# Patient Record
Sex: MALE | Race: WHITE | NOT HISPANIC OR LATINO | URBAN - METROPOLITAN AREA
[De-identification: names, ages, dates, MRNs, and addresses within clinical notes are randomized per-mention and may not be internally consistent; named-entity substitution may affect disease eponyms.]

---

## 2022-12-21 ENCOUNTER — OUTPATIENT (OUTPATIENT)
Dept: OUTPATIENT SERVICES | Facility: HOSPITAL | Age: 22
LOS: 1 days | Discharge: PSYCHIATRIC OR SUBSTANCE USE FACILITY | End: 2022-12-21
Payer: COMMERCIAL

## 2022-12-21 PROCEDURE — 90791 PSYCH DIAGNOSTIC EVALUATION: CPT | Mod: 95

## 2022-12-28 PROCEDURE — 99214 OFFICE O/P EST MOD 30 MIN: CPT | Mod: 95

## 2022-12-30 PROCEDURE — 90834 PSYTX W PT 45 MINUTES: CPT | Mod: 95

## 2023-01-20 DIAGNOSIS — F32.1 MAJOR DEPRESSIVE DISORDER, SINGLE EPISODE, MODERATE: ICD-10-CM

## 2023-02-01 PROCEDURE — 90834 PSYTX W PT 45 MINUTES: CPT | Mod: 95

## 2023-02-08 PROCEDURE — 90834 PSYTX W PT 45 MINUTES: CPT | Mod: 95

## 2023-02-08 PROCEDURE — 99214 OFFICE O/P EST MOD 30 MIN: CPT | Mod: 95

## 2023-02-16 PROCEDURE — 90832 PSYTX W PT 30 MINUTES: CPT | Mod: 95

## 2023-02-22 PROCEDURE — 99214 OFFICE O/P EST MOD 30 MIN: CPT | Mod: 95

## 2023-03-02 PROCEDURE — 90834 PSYTX W PT 45 MINUTES: CPT | Mod: 95

## 2023-03-08 PROCEDURE — 99214 OFFICE O/P EST MOD 30 MIN: CPT | Mod: 95

## 2023-03-10 PROCEDURE — 90834 PSYTX W PT 45 MINUTES: CPT | Mod: 95

## 2023-03-15 PROCEDURE — 90832 PSYTX W PT 30 MINUTES: CPT | Mod: 95

## 2023-03-22 PROCEDURE — 90834 PSYTX W PT 45 MINUTES: CPT | Mod: 95

## 2023-03-22 PROCEDURE — 99214 OFFICE O/P EST MOD 30 MIN: CPT | Mod: 95

## 2023-03-29 PROCEDURE — 90834 PSYTX W PT 45 MINUTES: CPT | Mod: 95

## 2023-04-05 PROCEDURE — 90834 PSYTX W PT 45 MINUTES: CPT | Mod: 95

## 2023-04-05 PROCEDURE — 99214 OFFICE O/P EST MOD 30 MIN: CPT | Mod: 95

## 2023-04-19 PROCEDURE — 99214 OFFICE O/P EST MOD 30 MIN: CPT | Mod: 95

## 2023-04-19 PROCEDURE — 90832 PSYTX W PT 30 MINUTES: CPT

## 2023-05-03 PROCEDURE — 99214 OFFICE O/P EST MOD 30 MIN: CPT | Mod: 95

## 2023-05-03 PROCEDURE — 90834 PSYTX W PT 45 MINUTES: CPT | Mod: 95

## 2024-01-24 ENCOUNTER — OUTPATIENT (OUTPATIENT)
Dept: OUTPATIENT SERVICES | Facility: HOSPITAL | Age: 24
LOS: 1 days | Discharge: STILL ADMITTED OR ADMINISTRATIVE | End: 2024-01-24
Payer: COMMERCIAL

## 2024-01-24 ENCOUNTER — INPATIENT (INPATIENT)
Facility: HOSPITAL | Age: 24
LOS: 6 days | Discharge: HOME | End: 2024-01-31
Attending: STUDENT IN AN ORGANIZED HEALTH CARE EDUCATION/TRAINING PROGRAM | Admitting: STUDENT IN AN ORGANIZED HEALTH CARE EDUCATION/TRAINING PROGRAM
Payer: COMMERCIAL

## 2024-01-24 DIAGNOSIS — F33.3 MAJOR DEPRESSIVE DISORDER, RECURRENT, SEVERE WITH PSYCHOTIC SYMPTOMS: ICD-10-CM

## 2024-01-24 LAB
A1C WITH ESTIMATED AVERAGE GLUCOSE RESULT: 4.7 % — SIGNIFICANT CHANGE UP (ref 4–5.6)
AMPHET UR-MCNC: NEGATIVE — SIGNIFICANT CHANGE UP
ANION GAP SERPL CALC-SCNC: 11 MMOL/L — SIGNIFICANT CHANGE UP (ref 7–14)
APAP SERPL-MCNC: <10 UG/ML — LOW (ref 15–25)
BARBITURATES UR SCN-MCNC: NEGATIVE — SIGNIFICANT CHANGE UP
BENZODIAZ UR-MCNC: POSITIVE
BUN SERPL-MCNC: 16 MG/DL — SIGNIFICANT CHANGE UP (ref 7–23)
CALCIUM SERPL-MCNC: 10.1 MG/DL — SIGNIFICANT CHANGE UP (ref 8.4–10.5)
CHLORIDE SERPL-SCNC: 100 MMOL/L — SIGNIFICANT CHANGE UP (ref 98–107)
CHOLEST SERPL-MCNC: 169 MG/DL — SIGNIFICANT CHANGE UP
CO2 SERPL-SCNC: 28 MMOL/L — SIGNIFICANT CHANGE UP (ref 22–31)
COCAINE METAB.OTHER UR-MCNC: NEGATIVE — SIGNIFICANT CHANGE UP
CREAT SERPL-MCNC: 1.08 MG/DL — SIGNIFICANT CHANGE UP (ref 0.5–1.3)
CREATININE URINE RESULT, DAU: 506 MG/DL — SIGNIFICANT CHANGE UP
EGFR: 100 ML/MIN/1.73M2 — SIGNIFICANT CHANGE UP
ESTIMATED AVERAGE GLUCOSE: 88 — SIGNIFICANT CHANGE UP
ETHANOL SERPL-MCNC: <10 MG/DL — SIGNIFICANT CHANGE UP
FLUAV AG NPH QL: SIGNIFICANT CHANGE UP
FLUBV AG NPH QL: SIGNIFICANT CHANGE UP
GLUCOSE SERPL-MCNC: 92 MG/DL — SIGNIFICANT CHANGE UP (ref 70–99)
HDLC SERPL-MCNC: 55 MG/DL — SIGNIFICANT CHANGE UP
LIPID PNL WITH DIRECT LDL SERPL: 93 MG/DL — SIGNIFICANT CHANGE UP
METHADONE UR-MCNC: NEGATIVE — SIGNIFICANT CHANGE UP
NON HDL CHOLESTEROL: 114 MG/DL — SIGNIFICANT CHANGE UP
OPIATES UR-MCNC: NEGATIVE — SIGNIFICANT CHANGE UP
OXYCODONE UR-MCNC: NEGATIVE — SIGNIFICANT CHANGE UP
PCP SPEC-MCNC: SIGNIFICANT CHANGE UP
PCP UR-MCNC: NEGATIVE — SIGNIFICANT CHANGE UP
POTASSIUM SERPL-MCNC: 3.7 MMOL/L — SIGNIFICANT CHANGE UP (ref 3.5–5.3)
POTASSIUM SERPL-SCNC: 3.7 MMOL/L — SIGNIFICANT CHANGE UP (ref 3.5–5.3)
RSV RNA NPH QL NAA+NON-PROBE: SIGNIFICANT CHANGE UP
SALICYLATES SERPL-MCNC: <0.3 MG/DL — LOW (ref 15–30)
SARS-COV-2 RNA SPEC QL NAA+PROBE: SIGNIFICANT CHANGE UP
SODIUM SERPL-SCNC: 139 MMOL/L — SIGNIFICANT CHANGE UP (ref 135–145)
THC UR QL: POSITIVE
TOXICOLOGY SCREEN, DRUGS OF ABUSE, SERUM RESULT: SIGNIFICANT CHANGE UP
TRIGL SERPL-MCNC: 106 MG/DL — SIGNIFICANT CHANGE UP
TSH SERPL-MCNC: 1.03 UIU/ML — SIGNIFICANT CHANGE UP (ref 0.27–4.2)

## 2024-01-24 PROCEDURE — 93010 ELECTROCARDIOGRAM REPORT: CPT

## 2024-01-24 PROCEDURE — 90839 PSYTX CRISIS INITIAL 60 MIN: CPT

## 2024-01-24 PROCEDURE — 90840 PSYTX CRISIS EA ADDL 30 MIN: CPT

## 2024-01-24 PROCEDURE — 99222 1ST HOSP IP/OBS MODERATE 55: CPT

## 2024-01-24 RX ORDER — LANOLIN ALCOHOL/MO/W.PET/CERES
6 CREAM (GRAM) TOPICAL AT BEDTIME
Refills: 0 | Status: DISCONTINUED | OUTPATIENT
Start: 2024-01-24 | End: 2024-01-25

## 2024-01-24 RX ORDER — ALPRAZOLAM 0.25 MG
0.5 TABLET ORAL DAILY
Refills: 0 | Status: DISCONTINUED | OUTPATIENT
Start: 2024-01-24 | End: 2024-01-31

## 2024-01-24 RX ORDER — FLUOXETINE HCL 10 MG
20 CAPSULE ORAL DAILY
Refills: 0 | Status: DISCONTINUED | OUTPATIENT
Start: 2024-01-24 | End: 2024-01-25

## 2024-01-24 RX ORDER — BUPROPION HYDROCHLORIDE 150 MG/1
300 TABLET, EXTENDED RELEASE ORAL DAILY
Refills: 0 | Status: DISCONTINUED | OUTPATIENT
Start: 2024-01-24 | End: 2024-01-25

## 2024-01-24 RX ORDER — HYDROXYZINE HCL 10 MG
25 TABLET ORAL EVERY 6 HOURS
Refills: 0 | Status: DISCONTINUED | OUTPATIENT
Start: 2024-01-24 | End: 2024-01-31

## 2024-01-24 RX ORDER — DIPHENHYDRAMINE HCL 50 MG
50 CAPSULE ORAL ONCE
Refills: 0 | Status: DISCONTINUED | OUTPATIENT
Start: 2024-01-24 | End: 2024-01-31

## 2024-01-24 RX ORDER — MAGNESIUM HYDROXIDE 400 MG/1
30 TABLET, CHEWABLE ORAL DAILY
Refills: 0 | Status: DISCONTINUED | OUTPATIENT
Start: 2024-01-24 | End: 2024-01-31

## 2024-01-24 RX ORDER — ALPRAZOLAM 0.25 MG
1 TABLET ORAL
Refills: 0 | DISCHARGE

## 2024-01-24 RX ORDER — ACETAMINOPHEN 500 MG
650 TABLET ORAL EVERY 6 HOURS
Refills: 0 | Status: DISCONTINUED | OUTPATIENT
Start: 2024-01-24 | End: 2024-01-31

## 2024-01-24 RX ORDER — ARIPIPRAZOLE 15 MG/1
1 TABLET ORAL DAILY
Refills: 0 | Status: DISCONTINUED | OUTPATIENT
Start: 2024-01-24 | End: 2024-01-26

## 2024-01-24 RX ADMIN — Medication 1 MILLIGRAM(S): at 21:36

## 2024-01-24 RX ADMIN — Medication 6 MILLIGRAM(S): at 20:32

## 2024-01-24 NOTE — BH INPATIENT PSYCHIATRY ASSESSMENT NOTE - NSBHMETABOLIC_PSY_ALL_CORE_FT
BMI:   HbA1c:   Glucose:   BP: --Vital Signs Last 24 Hrs  T(C): --  T(F): --  HR: --  BP: --  BP(mean): --  RR: --  SpO2: --      Lipid Panel:  BMI:   HbA1c: A1C with Estimated Average Glucose Result: 4.7 % (01-24-24 @ 19:18)    Glucose:   BP: --Vital Signs Last 24 Hrs  T(C): --  T(F): --  HR: --  BP: --  BP(mean): --  RR: --  SpO2: --      Lipid Panel:

## 2024-01-24 NOTE — BH INPATIENT PSYCHIATRY ASSESSMENT NOTE - RISK ASSESSMENT
Acute risk to self given mood episode, insomnia, poor appetite, missed class with difficulty getting out of bed, struggling to do ADLs, low energy, no motivation and anhedonia. Pt reports recurrent intrusive thoughts that his friends don’t like him, he’s a burden, he’s better off dead, he’s a loser and has no future and girlfriend is cheating on him; SI/attempts and preparations were impulsive and unsure if he can remain safe in the community. He had a self-aborted attempt/gesture this morning where he placed a belt around his neck

## 2024-01-24 NOTE — BH INPATIENT PSYCHIATRY ASSESSMENT NOTE - CURRENT MEDICATION
MEDICATIONS  (STANDING):  ARIPiprazole 1 milliGRAM(s) Oral daily  buPROPion XL (24-Hour) 300 milliGRAM(s) Oral daily  FLUoxetine 20 milliGRAM(s) Oral daily    MEDICATIONS  (PRN):  ALPRAZolam 0.5 milliGRAM(s) Oral daily PRN for anxiety  hydrOXYzine hydrochloride 25 milliGRAM(s) Oral every 6 hours PRN Restlessness   MEDICATIONS  (STANDING):  ARIPiprazole 1 milliGRAM(s) Oral daily  buPROPion XL (24-Hour) 300 milliGRAM(s) Oral daily  FLUoxetine 20 milliGRAM(s) Oral daily  melatonin. 6 milliGRAM(s) Oral at bedtime    MEDICATIONS  (PRN):  acetaminophen     Tablet .. 650 milliGRAM(s) Oral every 6 hours PRN Temp greater or equal to 38C (100.4F), Mild Pain (1 - 3)  ALPRAZolam 0.5 milliGRAM(s) Oral daily PRN for anxiety  aluminum hydroxide/magnesium hydroxide/simethicone Suspension 30 milliLiter(s) Oral every 6 hours PRN Dyspepsia  diphenhydrAMINE Injectable 50 milliGRAM(s) IntraMuscular once PRN agitation  hydrOXYzine hydrochloride 25 milliGRAM(s) Oral every 6 hours PRN Restlessness  LORazepam   Injectable 2 milliGRAM(s) IntraMuscular once PRN agitation  magnesium hydroxide Suspension 30 milliLiter(s) Oral daily PRN Constipation

## 2024-01-24 NOTE — BH INPATIENT PSYCHIATRY ASSESSMENT NOTE - NSCOMMENTSUICRISKFACT_PSY_ALL_CORE
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

## 2024-01-24 NOTE — CHART NOTE - NSCHARTNOTEFT_GEN_A_CORE
Screening Medical Evaluation    Patient Admitted from: Crisis center     [facility] [place] admitting diagnosis: Severe episode of recurrent major depressive disorder, with psychotic features      PAST MEDICAL & SURGICAL HISTORY:        Allergies    No Known Allergies    Intolerances          Social History:       FAMILY HISTORY:        MEDICATIONS  (STANDING):  ARIPiprazole 1 milliGRAM(s) Oral daily  buPROPion XL (24-Hour) 300 milliGRAM(s) Oral daily  FLUoxetine 20 milliGRAM(s) Oral daily  LORazepam     Tablet 1 milliGRAM(s) Oral once  melatonin. 6 milliGRAM(s) Oral at bedtime    MEDICATIONS  (PRN):  acetaminophen     Tablet .. 650 milliGRAM(s) Oral every 6 hours PRN Temp greater or equal to 38C (100.4F), Mild Pain (1 - 3)  ALPRAZolam 0.5 milliGRAM(s) Oral daily PRN for anxiety  aluminum hydroxide/magnesium hydroxide/simethicone Suspension 30 milliLiter(s) Oral every 6 hours PRN Dyspepsia  diphenhydrAMINE Injectable 50 milliGRAM(s) IntraMuscular once PRN agitation  hydrOXYzine hydrochloride 25 milliGRAM(s) Oral every 6 hours PRN Restlessness  LORazepam   Injectable 2 milliGRAM(s) IntraMuscular once PRN agitation  magnesium hydroxide Suspension 30 milliLiter(s) Oral daily PRN Constipation            PHYSICAL EXAM:  GENERAL: NAD.   HEAD:  Atraumatic, Normocephalic  EYES: Wears glasses EOMI, PERRLA, conjunctiva and sclera clear  NECK: Supple, No JVD  CHEST/LUNG: Clear to auscultation bilaterally; No wheeze  HEART: Regular rate and rhythm; No murmurs, rubs, or gallops  ABDOMEN: Positive BS,  Soft, Nontender.   EXTREMITIES:  2+ Peripheral Pulses, No clubbing, cyanosis, or edema  PSYCH: Calm and cooperative   NEUROLOGY: non-focal  SKIN: No rashes or lesions seen on exposed skin.     LABS:    01-24    139  |  100  |  16  ----------------------------<  92  3.7   |  28  |  1.08    Ca    10.1      24 Jan 2024 19:18            Urinalysis Basic - ( 24 Jan 2024 19:18 )    Color: x / Appearance: x / SG: x / pH: x  Gluc: 92 mg/dL / Ketone: x  / Bili: x / Urobili: x   Blood: x / Protein: x / Nitrite: x   Leuk Esterase: x / RBC: x / WBC x   Sq Epi: x / Non Sq Epi: x / Bacteria: x        RADIOLOGY & ADDITIONAL TESTS:      Assessment and Plan:  23M admitted to [facility] for Severe episode of recurrent major depressive disorder, with psychotic features. No PMHx. Pt seen for medical screening evaluation. Patient has no acute complaints at this time. Patient denies fever, chills, headache, dizziness, lightheadedness, N/V, SOB, cough, congestion, chest pain, abdominal pain, dysuria, hematuria, diarrhea, constipation. Physical exam unremarkable, VSS. Labs above. 1/24 EKG: NSR 86b/ min, RAD, QT/ QTC= 354/ 423.     1.) Severe episode of recurrent major depressive disorder, with psychotic features: Plan per primary team.

## 2024-01-24 NOTE — BH INPATIENT PSYCHIATRY ASSESSMENT NOTE - HPI (INCLUDE ILLNESS QUALITY, SEVERITY, DURATION, TIMING, CONTEXT, MODIFYING FACTORS, ASSOCIATED SIGNS AND SYMPTOMS)
DIRECT ADMISSION FROM [facility]: a 23-year-old male, single, non-caregiver, college student at [employer] residing in off campus housing with 5 friends and permanently domiciled with parents and older sister just moved out of home, first sought care in Fall 2022 when he first felt anxious and experiencing panic attacks, dx of MDD with anxious distress, hx of experimenting with agents (last semester took Adderall for about 1 week obtained from someone else); Cannabis use ~ 1x/week; most recent use last week. ETOH use fluctuates from 2x/week to 5x/week; at most drank 10 drinks with blackouts; denies complicated withdrawals/DTs/seizures. Pt was engaged in care with [facility] 12/21/22-5/12/23. Pt was referred to [facility] following a suicidal gesture of consuming ETOH and Klonopin, his friends on campus checked on him and called campus security. Pt reports he was admitted to [facility] for about 1 week to 10 days, was COVID + at the time and received care in their medical ED vs psychiatric; currently sees psychiatrist Dr Gricelda Lazar (phone [phone]) (on campus) -  last seen 2 days ago and also sees a therapist. Today, Patient presented to the [facility] seeking assessment for possible admission. Pt reports 6 weeks ago experiencing changes with his medication.    Pt reported that he has been taking Prozac 20 mg since d/c with [facility], Abilify 2 mg was reduced to 1 mg 6 weeks ago. Wellbutrin was added 6 weeks ago and currently taking 300 mg. Pt additionally taking Xanax 0.5 mg PRN, notes taking a few times per month. Hydroxyzine uncertain of dosage and at times takes Propranolol left over from [facility], Dr Lazar not currently prescribing. Pt reports since taking Wellbutrin has been experiencing racing and intrusive thoughts and feeling out of control, with SI and suicidal behaviors. Pt reports yesterday morning was not experiencing SI, had therapy session around noon. Pt reports he began thinking about stressors in his life such as rejection from school he applied to, let go from job, self-identify, gender and sexuality confusion and strain in relationship with girlfriend. Pt reports following session began to have thoughts that yesterday was the day he was going to end his life. Went home from his girlfriends and obtained large amount of ETOH, wrote suicide letter and gathered pills. Pt went to his girlfriend’s art studio to say goodbye when she noticed he was distressed, took pt to his home and found ETOH, pills and letter. Girlfriend contacted pt’s parents who picked pt up from school. Pt reports this morning his parents left him alone for 5 minutes, during that time frame put his belt around his neck tightening it, pt notes he released belt shortly afterwards. Pt did not make parents aware of this morning’s attempt until meeting with writer at crisis center. Pt notes concern that his actions last night and today were impulsive. Pt reports SI did not dissipate until he was in the waiting room at crisis center waiting to be seen.    COLLATERAL FROM PT'S PARENTS Harriett and Patrick Romero: [phone] and [phone] : Pt’s parents report he has been seeing therapist multiple times per week as of recent. Parents report they received call from pt’s girlfriend last night, after she discovered letter, pills and ETOH. Parents picked pt up from school arriving home before midnight, note pt slept until this morning. While meeting with parents, pt discussed this morning’s aborted suicide attempt as parents were not aware. Parents report pt appeared less distressed while at Crisis center though last night and this morning appeared to be in crisis. Parents report concern that pt is not sleeping fully through the night and periods of not drinking water or eating enough. Parents state they are agreeable with plan recommended by crisis center at this time.     DIRECT ADMISSION FROM [facility]: a 23-year-old male, single, non-caregiver, college student at [employer] in [place], residing in off campus housing with 5 friends and permanently domiciled with parents and older sister just moved out of home, first sought care in Fall 2022 when he first felt anxious and experiencing panic attacks, dx of MDD with anxious distress, hx of experimenting with agents (last semester took Adderall for about 1 week obtained from someone else); Cannabis use ~ 1x/week; most recent use last week. ETOH use fluctuates from 2x/week to 5x/week; at most drank 10 drinks with blackouts; denies complicated withdrawals/DTs/seizures. Pt was engaged in care with [facility] 12/21/22-5/12/23. Pt was referred to [facility] following a suicidal gesture of consuming ETOH and Klonopin, his friends on campus checked on him and called campus security. Pt reports he was admitted to [facility] for about 1 week to 10 days, was COVID + at the time and received care in their medical ED vs psychiatric; currently sees psychiatrist Dr Gricelda Lazar (phone [phone]) (on campus) -  last seen 2 days ago and also sees a therapist. Today, Patient presented to the [facility] seeking assessment for possible admission. Pt reports 6 weeks ago experiencing changes with his medication.    Pt reported that he has been taking Prozac 20 mg since d/c with [facility], Abilify 2 mg was reduced to 1 mg 6 weeks ago. Wellbutrin was added 6 weeks ago and currently taking 300 mg. Pt additionally taking Xanax 0.5 mg PRN, notes taking a few times per month. Hydroxyzine uncertain of dosage and at times takes Propranolol left over from [facility], Dr Lazar not currently prescribing. Pt reports since taking Wellbutrin has been experiencing racing and intrusive thoughts and feeling out of control, with SI and suicidal behaviors. Pt reports yesterday morning was not experiencing SI, had therapy session around noon. Pt reports he began thinking about stressors in his life such as rejection from school he applied to, let go from job, self-identify, gender and sexuality confusion and strain in relationship with girlfriend. Pt reports following session began to have thoughts that yesterday was the day he was going to end his life. Went home from his girlfriends and obtained large amount of ETOH, wrote suicide letter and gathered pills. Pt went to his girlfriend’s art studio to say goodbye when she noticed he was distressed, took pt to his home and found ETOH, pills and letter. Girlfriend contacted pt’s parents who picked pt up from school. Pt reports this morning his parents left him alone for 5 minutes, during that time frame put his belt around his neck tightening it, pt notes he released belt shortly afterwards. Pt did not make parents aware of this morning’s attempt until meeting with writer at crisis center. Pt notes concern that his actions last night and today were impulsive. Pt reports SI did not dissipate until he was in the waiting room at crisis center waiting to be seen.    On Unit L[address]: calm, cooperative, on the phone with his mom getting a peptalk as Pt is visibly taken aback by the L3 milieu. Emotional support given, encouraged ot tell staff anytime he feels unsafe etc and that staff will try to move him in AM. He said ok.     COLLATERAL FROM PT'S PARENTS Harriett and Patrick Heather: [phone] and [phone] : Pt’s parents report he has been seeing therapist multiple times per week as of recent. Parents report they received call from pt’s girlfriend last night, after she discovered letter, pills and ETOH. Parents picked pt up from school arriving home before midnight, note pt slept until this morning. While meeting with parents, pt discussed this morning’s aborted suicide attempt as parents were not aware. Parents report pt appeared less distressed while at Crisis center though last night and this morning appeared to be in crisis. Parents report concern that pt is not sleeping fully through the night and periods of not drinking water or eating enough. Parents state they are agreeable with plan recommended by crisis center at this time.

## 2024-01-24 NOTE — BH INPATIENT PSYCHIATRY ASSESSMENT NOTE - NSBHASSESSSUMMFT_PSY_ALL_CORE
Collateral from Dr. Gricelda Lazar: [phone]: who advocates for admission. Recommend carolin and d/c Wellbutrin, titrate Abilify to 2mg and titrate Prozac.  Pt's psychiatrist Dr. Gricelda Lazar: [phone] recommended taper and d/c Wellbutrin, titrate Abilify to 2mg and titrate Prozac.   - Patient should be moved to the [facility] as soon as possible. Discussed potential move to a less acute Unit tonight with AND Delisa but there were no male beds anywhere. Patient informed. Central Intake emailed this evening with reminder about this patient for tomorrow.

## 2024-01-24 NOTE — BH INPATIENT PSYCHIATRY ASSESSMENT NOTE - NSBHSACONSEQUENCE_PSY_A_CORE FT
last semester was taking Adderall for about 1 week span obtained from someone else, cannabis use about 1 time per week with most recent use last week. ETOH use fluctuates from a couple times per week to about 5 times per week. Reports current use as a couple times per week consuming 2-3 drinks in a setting. Pt reports in the past he has consumed upwards of 10 drinks in a setting with hx of blackouts

## 2024-01-24 NOTE — BH PATIENT PROFILE - HOME MEDICATIONS
hydrOXYzine hydrochloride 25 mg oral tablet , 1 tab(s) orally 2 times a day as needed for mild restlessness  Xanax 0.5 mg oral tablet , 1 tab(s) orally once a day as needed for  anxiety  buPROPion 300 mg/24 hours (XL) oral tablet, extended release , 1 tab(s) orally once a day  Abilify 2 mg oral tablet , 0.5 tab(s) orally once a day  PROzac 20 mg oral capsule , 1 cap(s) orally once a day

## 2024-01-24 NOTE — BH INPATIENT PSYCHIATRY ASSESSMENT NOTE - DETAILS
see HPI : sister, mother, and father with hx of depression and anxiety. Notes family members are prescribed Xanax and sister prescribed SSRI. Father’s brother  from accidental OD, with hx of substance abuse. Denies family hx of suicide

## 2024-01-24 NOTE — BH INPATIENT PSYCHIATRY ASSESSMENT NOTE - MSE UNSTRUCTURED FT
awake, alert, oriented x 4, looks stated age. Appearance: stated age; fair eye contact; fair grooming and hygiene Behavior: cooperative Speech: WNL Mood: anxious; Affect: congruent, sad and tearful, thought process: no current evidence of thought process disorder; Thought content: denied SI presently, HI, AVH, appropriately discussing outpatient treatment Cognition: grossly intact Insight/Judgement: fair

## 2024-01-25 VITALS — TEMPERATURE: 98 F

## 2024-01-25 PROCEDURE — 99232 SBSQ HOSP IP/OBS MODERATE 35: CPT | Mod: GC

## 2024-01-25 PROCEDURE — 90853 GROUP PSYCHOTHERAPY: CPT

## 2024-01-25 RX ORDER — LANOLIN ALCOHOL/MO/W.PET/CERES
3 CREAM (GRAM) TOPICAL AT BEDTIME
Refills: 0 | Status: DISCONTINUED | OUTPATIENT
Start: 2024-01-25 | End: 2024-01-31

## 2024-01-25 RX ORDER — FLUOXETINE HCL 10 MG
40 CAPSULE ORAL DAILY
Refills: 0 | Status: DISCONTINUED | OUTPATIENT
Start: 2024-01-26 | End: 2024-01-31

## 2024-01-25 RX ADMIN — Medication 20 MILLIGRAM(S): at 08:10

## 2024-01-25 RX ADMIN — ARIPIPRAZOLE 1 MILLIGRAM(S): 15 TABLET ORAL at 08:10

## 2024-01-25 RX ADMIN — Medication 3 MILLIGRAM(S): at 21:49

## 2024-01-25 NOTE — BH PSYCHOLOGY - GROUP THERAPY NOTE - NSBHPSYCHOLPARTICIPCOMMENT_PSY_A_CORE FT
Patient arrived to the session on time; patient appeared attentive and interested in the group discussion.  Although the patient did not contribute spontaneously during the group session, patient was able to read from the worksheet when prompted. Patient was able to engage with the  and interact with the other group members in an appropriate manner.

## 2024-01-25 NOTE — BH INPATIENT PSYCHIATRY PROGRESS NOTE - MSE UNSTRUCTURED FT
Appearance: Dressed appropriately. Fair hygiene. Appropriately dressed  Attitude / Behavior: Cooperative and pleasant. Initially withdrawn, progressively more open and humorous throughout interview  Eye contact: Good  Motor: no PMR/PMA  Speech: Normal rate, rhythm, tone, volume. Fluent speech   Mood: "defeated"  Affect: euthymic, mildly anxious  Thought Process: Initial stream of consciousness, but progressively more linear and goal directed  Thought Content: No SIIP/HIIP/AVH reported.   Associations: Good  Perceptual: Not responding to internal stimuli   Attention: Good  Insight: relatively good   Judgment: Poor   Gait/station: ambulating without issue. Appearance: Dressed appropriately. Fair hygiene. Appropriately dressed  Attitude / Behavior: Cooperative and pleasant. Initially withdrawn, progressively more open and humorous throughout interview  Eye contact: Good  Motor: no PMR/PMA  Speech: Normal rate, rhythm, tone, volume. Fluent speech   Mood: "defeated"  Affect: euthymic, mildly anxious  Thought Process: Initial stream of consciousness, but progressively more linear and goal directed  Thought Content: No SIIP/HIIP reported.   Associations: intact  Perceptual: denies AH, VH, Not responding to internal stimuli   Attention: Good  Insight: fair  Judgment: Poor prior to admission  Gait/station: ambulating without issue.

## 2024-01-25 NOTE — BH INPATIENT PSYCHIATRY PROGRESS NOTE - NSBHASSESSSUMMFT_PSY_ALL_CORE
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 Pt was engaged in care with [facility] 12/21/22-5/12/23 following a suicide attempt via OD with ETOH and clonazepam. Currently sees [facility] psychiatrist Dr Gricelda Lazar (phone [phone]) last seen 2 days ago and also sees a therapist. Today, Patient presented to the [facility] seeking assessment for possible admission following suicidal gesture     On evaluation today the patient continues to be dysphoric and anxious, with subjective report of feelings of hopelessness and being "defeated" and continued passive thoughts of death. Attributes recent feelings to "activation" and impulsivity following recent med changes with addition of Wellbutrin to regimen, leading to poor resilience to recent psychosocial stressors in the form of school, career, internship, and romantic relationship as described in HPI. Patient presentation likely due to an episode of major depressive disorder with anxiety in the setting of medication non-optimization.     Patient is at acute risk of harm to self and would benefit from inpatient treatment for medication optimization, stabilization of symptoms, and psychotherapy as appropriate. Patient would benefit from IOP following discharge for continued followup and care.     PLAN  Legal  - admitted on 9.13 voluntary    Safety  - routine obs    Psychiatric  - aripiprazole 1mg PO daily, with plan to titrate  - INCREASE fluoxetine to 40mg starting 1/26  - DISCONTINUE wellbutrin 300mg daily  - melatonin 6mg PO qHS   PRN  - hydroxyzine 25mg PO q6 PRN for restlessness  - diphenhydramine 50mg IM PRN for agitation  - lorazepam 2mg IM PRN for agitation     Medical  PRN  - acetaminphen 650mg PO q6 PRN for pain or fever  - maalox 30mL PO q6 PRN for dyspepsia  - magnesium hydroxide 30ML PO daily PRN for constipation  Other  - I/G/M  - collateral from family  - per pt's psychiatrist Dr. Gricelda Lazar: [phone] recommended taper and d/c Wellbutrin, titrate Abilify to 2mg and titrate Prozac.   - discussed potential move to college unit, patient ambivalent at this time and will consider    Dispo  - anticipate home with potential IOP/PHP    ASSESSMENT  Patient is a 23-year-old male, single, non-caregiver, college student at [employer], no significant PMHx, PPHx of MDD with anxious distress, prior suicide gesture/attempt by OD, currently in outpatient treatment (Dr. Gricelda Lazar), who presents to crisis center with family due to 6 weeks of decline in functioning and depressive symptoms and recent impulsive suicidal behavior and preparatory actions in the setting of medication changes and psychosocial stressors     On evaluation today the patient continues to be dysphoric and anxious, with subjective report of feelings of hopelessness and being "defeated" and continued passive thoughts of death. Attributes recent feelings to "activation" and impulsivity following recent med changes with addition of Wellbutrin to regimen, leading to poor resilience to recent psychosocial stressors in the form of school, career, internship, and romantic relationship as described in HPI. Patient presentation likely due to an episode of major depressive disorder with anxiety and possible adverse medication effects.    Patient is at acute risk of harm to self and would benefit from inpatient treatment for medication optimization, stabilization of symptoms, and psychotherapy as appropriate.     PLAN  Legal  - admitted on 9.13 voluntary    Safety  - routine obs    Psychiatric  - aripiprazole 1mg PO daily, with plan to titrate  - INCREASE fluoxetine to 40mg starting 1/26  - DISCONTINUE wellbutrin 300mg daily (had stopped taking past few days)  - melatonin 6mg PO qHS   PRN  - hydroxyzine 25mg PO q6 PRN for restlessness  - diphenhydramine 50mg IM PRN for agitation  - lorazepam 2mg IM PRN for agitation     Medical  PRN  - acetaminphen 650mg PO q6 PRN for pain or fever  - maalox 30mL PO q6 PRN for dyspepsia  - magnesium hydroxide 30ML PO daily PRN for constipation  Other  - I/G/M  - collateral from family  - per pt's psychiatrist Dr. Gricelda Lazar: [phone] recommended taper and d/c Wellbutrin, titrate Abilify to 2mg and titrate Prozac.   - discussed potential move to college unit, patient ambivalent at this time and will consider    Dispo  - anticipate home with potential IOP/PHP    ASSESSMENT  Patient is a 23-year-old male, single, non-caregiver, college student at [employer], no significant PMHx, PPHx of MDD with anxious distress, prior suicide gesture/attempt by OD, currently in outpatient treatment (Dr. Gricedla Lazar), who presents to crisis center with family due to 6 weeks of decline in functioning and depressive symptoms and recent impulsive suicidal behavior and preparatory actions in the setting of medication changes and psychosocial stressors     On evaluation today the patient continues to be dysphoric and anxious, with subjective report of feelings of hopelessness and being "defeated" and continued passive thoughts of death. Attributes recent feelings to "activation" and impulsivity following recent med changes with addition of Wellbutrin to regimen, leading to poor resilience to recent psychosocial stressors in the form of school, career, internship, and romantic relationship as described in HPI. Patient presentation likely due to an episode of major depressive disorder with anxiety and possible adverse medication effects.    Patient is at acute risk of harm to self and would benefit from inpatient treatment for medication optimization, stabilization of symptoms, and psychotherapy as appropriate.     PLAN  Legal  - admitted on 9.13 voluntary    Safety  - routine obs    Psychiatric  - aripiprazole 1mg PO daily, with plan to titrate  - INCREASE fluoxetine to 40mg starting 1/26  - DISCONTINUE wellbutrin 300mg daily (had stopped taking past few days)  - melatonin 3mg PO qHS   PRN  - hydroxyzine 25mg PO q6 PRN for restlessness  - diphenhydramine 50mg IM PRN for agitation  - lorazepam 2mg IM PRN for agitation     Medical  PRN  - acetaminphen 650mg PO q6 PRN for pain or fever  - maalox 30mL PO q6 PRN for dyspepsia  - magnesium hydroxide 30ML PO daily PRN for constipation  Other  - I/G/M  - collateral from family  - per pt's psychiatrist Dr. Gricelda Lazar: [phone] recommended taper and d/c Wellbutrin, titrate Abilify to 2mg and titrate Prozac.   - discussed potential move to college unit, patient ambivalent at this time and will consider    Dispo  - anticipate home with potential IOP/PHP

## 2024-01-25 NOTE — BH TREATMENT PLAN - NSTXSUICIDINTERPR_PSY_ALL_CORE
Over the next 7 days, psychiatric rehabilitation staff will support the pt towards goal progress and encourage group pariticpation for skill development and socialization.

## 2024-01-25 NOTE — PSYCHIATRIC REHAB INITIAL EVALUATION - NSBHALCSUBUSE_PSY_ALL_CORE
Cannabis use ~ 1x/week    ETOH use fluctuates from 2x/week to 5x/week; at most drank 10 drinks with blackouts; denies complicated withdrawals/DTs/seizures

## 2024-01-25 NOTE — BH PSYCHOLOGY - GROUP THERAPY NOTE - NSPSYCHOLGRPGENPT_PSY_A_CORE FT
Patient attended the cognitive behavior therapy group session. Group focused on the topic of core beliefs including how beliefs develop over a lifetime, its helpfulness in regulating our behaviors, as well as how rigid and maladaptive they can be.  Group expectations and guidelines (as well as confidentiality and its limitations) were addressed. Principles of cognitive behavior therapy related to today's group topic were reviewed.  Group members illustrated understanding of these concepts by giving personal examples based on their current experiences. Discussions stemmed from the group topics to the causal connection between thoughts, feelings, and behaviors.

## 2024-01-25 NOTE — BH INPATIENT PSYCHIATRY PROGRESS NOTE - CURRENT MEDICATION
MEDICATIONS  (STANDING):  ARIPiprazole 1 milliGRAM(s) Oral daily  buPROPion XL (24-Hour) 300 milliGRAM(s) Oral daily  FLUoxetine 20 milliGRAM(s) Oral daily  melatonin. 6 milliGRAM(s) Oral at bedtime    MEDICATIONS  (PRN):  acetaminophen     Tablet .. 650 milliGRAM(s) Oral every 6 hours PRN Temp greater or equal to 38C (100.4F), Mild Pain (1 - 3)  ALPRAZolam 0.5 milliGRAM(s) Oral daily PRN for anxiety  aluminum hydroxide/magnesium hydroxide/simethicone Suspension 30 milliLiter(s) Oral every 6 hours PRN Dyspepsia  diphenhydrAMINE Injectable 50 milliGRAM(s) IntraMuscular once PRN agitation  hydrOXYzine hydrochloride 25 milliGRAM(s) Oral every 6 hours PRN Restlessness  LORazepam   Injectable 2 milliGRAM(s) IntraMuscular once PRN agitation  magnesium hydroxide Suspension 30 milliLiter(s) Oral daily PRN Constipation   MEDICATIONS  (STANDING):  ARIPiprazole 1 milliGRAM(s) Oral daily  melatonin. 6 milliGRAM(s) Oral at bedtime    MEDICATIONS  (PRN):  acetaminophen     Tablet .. 650 milliGRAM(s) Oral every 6 hours PRN Temp greater or equal to 38C (100.4F), Mild Pain (1 - 3)  ALPRAZolam 0.5 milliGRAM(s) Oral daily PRN for anxiety  aluminum hydroxide/magnesium hydroxide/simethicone Suspension 30 milliLiter(s) Oral every 6 hours PRN Dyspepsia  diphenhydrAMINE Injectable 50 milliGRAM(s) IntraMuscular once PRN agitation  hydrOXYzine hydrochloride 25 milliGRAM(s) Oral every 6 hours PRN Restlessness  LORazepam   Injectable 2 milliGRAM(s) IntraMuscular once PRN agitation  magnesium hydroxide Suspension 30 milliLiter(s) Oral daily PRN Constipation

## 2024-01-25 NOTE — PSYCHIATRIC REHAB INITIAL EVALUATION - NSBHPRRECOMMEND_PSY_ALL_CORE
Writer met with patient to orient the patient to the unit and introduce self, psychiatric rehabilitation staff and department functions. Patient was provided a copy of the unit schedule. Writer encouraged patient to attend psychiatric rehabilitation groups and engage in treatment. Writer and patient were able to establish a collaborative psychiatric rehabilitation goal.  Psychiatric Rehabilitation staff will continue to engage patient daily in order to develop therapeutic rapport.     Pt was admitted from [facility]. 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 Pt first sought care in Fall 2022 when he first felt anxious and experiencing panic attacks, dx of MDD with anxious distress, hx of experimenting with agents (last semester took Adderall for about 1 week obtained from someone else); Cannabis use ~ 1x/week with most recent use last week. ETOH use fluctuates from 2x/week to 5x/week; at most drank 10 drinks with blackouts; denies complicated withdrawals/DTs/seizures. Pt was engaged in care with [facility] 12/21/22-5/12/23. Pt was referred to [facility] following a suicidal gesture of consuming ETOH and Klonopin, his friends on campus checked on him and called campus security. Pt reports he was admitted to [facility] for about 1 week to 10 days, was COVID + at the time and received care in their medical ED vs psychiatric; currently sees psychiatrist Dr Gricelda Lazar (phone [phone]) (on campus) -  last seen 2 days ago and also sees a therapist. Today, Patient presented to the [facility] seeking assessment for possible admission. Pt reports 6 weeks ago experiencing changes with his medication.    On unit the pt is pleasant, calm, and cooperative. Pt agreeable to meeting with writer. Pt able to brighten. Pt offered coping resources and accepted books to read from psychiatric rehabilitation staff with good effect. Will continue to provide emotional support to the pt during the current stay.

## 2024-01-26 PROCEDURE — 99232 SBSQ HOSP IP/OBS MODERATE 35: CPT | Mod: GC

## 2024-01-26 PROCEDURE — 90853 GROUP PSYCHOTHERAPY: CPT

## 2024-01-26 RX ORDER — ARIPIPRAZOLE 15 MG/1
1 TABLET ORAL ONCE
Refills: 0 | Status: COMPLETED | OUTPATIENT
Start: 2024-01-26 | End: 2024-01-26

## 2024-01-26 RX ORDER — ARIPIPRAZOLE 15 MG/1
2 TABLET ORAL DAILY
Refills: 0 | Status: DISCONTINUED | OUTPATIENT
Start: 2024-01-27 | End: 2024-01-31

## 2024-01-26 RX ADMIN — ARIPIPRAZOLE 1 MILLIGRAM(S): 15 TABLET ORAL at 13:28

## 2024-01-26 RX ADMIN — Medication 3 MILLIGRAM(S): at 20:30

## 2024-01-26 RX ADMIN — ARIPIPRAZOLE 1 MILLIGRAM(S): 15 TABLET ORAL at 08:54

## 2024-01-26 RX ADMIN — Medication 40 MILLIGRAM(S): at 08:55

## 2024-01-26 NOTE — BH INPATIENT PSYCHIATRY PROGRESS NOTE - CURRENT MEDICATION
MEDICATIONS  (STANDING):  FLUoxetine 40 milliGRAM(s) Oral daily  melatonin. 3 milliGRAM(s) Oral at bedtime    MEDICATIONS  (PRN):  acetaminophen     Tablet .. 650 milliGRAM(s) Oral every 6 hours PRN Temp greater or equal to 38C (100.4F), Mild Pain (1 - 3)  ALPRAZolam 0.5 milliGRAM(s) Oral daily PRN for anxiety  aluminum hydroxide/magnesium hydroxide/simethicone Suspension 30 milliLiter(s) Oral every 6 hours PRN Dyspepsia  diphenhydrAMINE Injectable 50 milliGRAM(s) IntraMuscular once PRN agitation  hydrOXYzine hydrochloride 25 milliGRAM(s) Oral every 6 hours PRN Restlessness  LORazepam   Injectable 2 milliGRAM(s) IntraMuscular once PRN agitation  magnesium hydroxide Suspension 30 milliLiter(s) Oral daily PRN Constipation

## 2024-01-26 NOTE — BH INPATIENT PSYCHIATRY PROGRESS NOTE - NSBHASSESSSUMMFT_PSY_ALL_CORE
ASSESSMENT  Patient is a 23-year-old male, single, non-caregiver, college student at [employer], no significant PMHx, PPHx of MDD with anxious distress, prior suicide gesture/attempt by OD, currently in outpatient treatment (Dr. Gricelda Lazar), who presents to crisis center with family due to 6 weeks of decline in functioning and depressive symptoms and recent impulsive suicidal behavior and preparatory actions in the setting of medication changes and psychosocial stressors     On evaluation today the patient continues to be dysphoric and anxious, with subjective report of feelings of hopelessness and being "defeated" and continued passive thoughts of death. Attributes recent feelings to "activation" and impulsivity following recent med changes with addition of Wellbutrin to regimen, leading to poor resilience to recent psychosocial stressors in the form of school, career, internship, and romantic relationship as described in HPI. Patient presentation likely due to an episode of major depressive disorder with anxiety and possible adverse medication effects. Patient also with cluster B personality traits, endorses fantasies of people grieving following his death, lack of stable sense of self, chronic depression and low-lethality suicide attempts.     Patient is at acute risk of harm to self and would benefit from inpatient treatment for medication optimization, stabilization of symptoms, and psychotherapy as appropriate.       PLAN  Legal  - admitted on 9.13 voluntary    Safety  - routine obs    Psychiatric  - INC abilify to 2 mg qHS  - c/w fluoxetine to 40mg starting 1/26  - melatonin 3mg PO qHS   PRN  - hydroxyzine 25mg PO q6 PRN for restlessness  - diphenhydramine 50mg IM PRN for agitation  - lorazepam 2mg IM PRN for agitation     Medical  PRN  - acetaminphen 650mg PO q6 PRN for pain or fever  - maalox 30mL PO q6 PRN for dyspepsia  - magnesium hydroxide 30ML PO daily PRN for constipation  Other  - I/G/M  - collateral from family  - per pt's psychiatrist Dr. Gricelda Lazar: [phone] recommended taper and d/c Wellbutrin, titrate Abilify to 2mg and titrate Prozac.     Dispo  - anticipate home with potential IOP/PHP    ASSESSMENT  Patient is a 23-year-old male, single, non-caregiver, college student at [employer], no significant PMHx, PPHx of MDD with anxious distress, prior suicide gesture/attempt by OD, currently in outpatient treatment (Dr. Gricelda Lazar), who presents to crisis center with family due to 6 weeks of decline in functioning and depressive symptoms and recent impulsive suicidal behavior and preparatory actions in the setting of medication changes and psychosocial stressors     On evaluation today the patient continues to be dysphoric and anxious, with subjective report of feelings of hopelessness and being "defeated" and continued passive thoughts of death. Attributes recent feelings to "activation" and impulsivity following recent med changes with addition of Wellbutrin to regimen, leading to poor resilience to recent psychosocial stressors in the form of school, career, internship, and romantic relationship as described in HPI. Patient presentation likely due to an episode of major depressive disorder with anxiety and possible adverse medication effects. Patient also with cluster B personality traits, endorses fantasies of people grieving following his death, lack of stable sense of self, chronic depression and low-lethality suicide attempts.     Patient is at acute risk of harm to self and would benefit from inpatient treatment for medication optimization, stabilization of symptoms, and psychotherapy as appropriate.       PLAN  Legal  - admitted on 9.13 voluntary    Safety  - routine obs    Psychiatric  - INC abilify to 2 mg qdaily  - c/w fluoxetine to 40mg starting 1/26  - melatonin 3mg PO qHS   PRN  - hydroxyzine 25mg PO q6 PRN for restlessness  - diphenhydramine 50mg IM PRN for agitation  - lorazepam 2mg IM PRN for agitation     Medical  PRN  - acetaminphen 650mg PO q6 PRN for pain or fever  - maalox 30mL PO q6 PRN for dyspepsia  - magnesium hydroxide 30ML PO daily PRN for constipation  Other  - I/G/M  - collateral from family  - per pt's psychiatrist Dr. Gricelda Lazar: [phone] recommended taper and d/c Wellbutrin, titrate Abilify to 2mg and titrate Prozac.     Dispo  - anticipate home with potential IOP/PHP

## 2024-01-26 NOTE — BH SOCIAL WORK INITIAL PSYCHOSOCIAL EVALUATION - OTHER PAST PSYCHIATRIC HISTORY (INCLUDE DETAILS REGARDING ONSET, COURSE OF ILLNESS, INPATIENT/OUTPATIENT TREATMENT)
Pt transfered to college track unit. Pt is a 23-year-old male, single, non-caregiver, college student at [employer] in [place], residing in off campus housing with 5 friends and permanently domiciled with parents and older sister just moved out of home, first sought care in Fall 2022 when he first felt anxious and experiencing panic attacks, dx of MDD with anxious distress, hx of experimenting with agents (last semester took Adderall for about 1 week obtained from someone else); Cannabis use ~ 1x/week; most recent use last week. ETOH use fluctuates from 2x/week to 5x/week; at most drank 10 drinks with blackouts; denies complicated withdrawals/DTs/seizures. Pt was engaged in care with [facility] 12/21/22-5/12/23. Pt was referred to [facility] following a suicidal gesture of consuming ETOH and Klonopin, his friends on campus checked on him and called campus security. Pt reports he was admitted to [facility] for about 1 week to 10 days, was COVID + at the time and received care in their medical ED vs psychiatric; currently sees psychiatrist Dr Gricelda Lazar (phone [phone] Patient presented to the [facility] seeking assessment for possible admission. Pt reports 6 weeks ago experiencing changes with his medication.

## 2024-01-26 NOTE — BH INPATIENT PSYCHIATRY PROGRESS NOTE - NSBHATTESTCOMMENTATTENDFT_PSY_A_CORE
Patient seen and evaluated with Dr. Conti. Patient presenting as slightly dysphoric, anxious, though overall reactive, well-related and with good insight. Does describe ~6weeks of depressed mood, poor sleep, poor eating, high anxiety, increased sex drive in the setting of medication changes (addition of Wellbutrin, reduction in Abilify). More recently, impulsive, ambivalent suicidal thinking and behaviors, though able to make distress known to loved ones before seriously acting on these thoughts. Suspicion based on initial interview/collateral is for primary depressive disorder and adverse medication effect (overactivation from Wellbutrin). No evidence of cornel/hypomania or psychosis on evaluation, though family notes that in distress patient made unusual statements about knowing it was the time to die and referring to the specific time/date, that raised concern for psychotic thinking, though was transient and quickly resolved. Recommend ongoing monitoring for psychosis. Would also consider possible personality contributions, some cluster B traits elicited on interview. Plan to d/c Wellbutrin (had stopped taking past few days), and increase Prozac, as per recommendation of OP psychiatrist per patient and collateral note at Crisis Center. Called family to inform them of current plan and transfer to 1S. Family in agreement with transfer. Collateral from psychiatrist and more comprehensive collateral from family deferred to new primary team on 1S.  
Patient seen, chart reviewed, case discussed with team.  I saw the patient with the resident, discussed case with resident and reviewed all sections of the note, made changes where appropriate and agree with it as written.

## 2024-01-26 NOTE — BH INPATIENT PSYCHIATRY PROGRESS NOTE - MSE UNSTRUCTURED FT
Appearance: Dressed appropriately. Fair hygiene. Appropriately dressed  Attitude / Behavior: Cooperative and pleasant. Initially withdrawn, progressively more open and humorous throughout interview  Eye contact: Good  Motor: no PMR/PMA  Speech: Normal rate, rhythm, tone, volume. Fluent speech   Mood: "okay"  Affect: euthymic, mildly anxious  Thought Process: Initial stream of consciousness, but progressively more linear and goal directed  Thought Content: No SIIP/HIIP reported.   Associations: intact  Perceptual: denies AH, VH, Not responding to internal stimuli   Attention: Good  Insight: fair in that he agrees he needs mental health treatment and is cooperative  Judgment: Poor prior to admission  Gait/station: ambulating without issue.

## 2024-01-26 NOTE — BH SOCIAL WORK INITIAL PSYCHOSOCIAL EVALUATION - NSBHCHILDRESP_PSY_ALL_CORE
No no bladder infections/no dysuria/no urinary hesitancy/normal urinary frequency/no hematuria/no incontinence no bladder infections/no hematuria

## 2024-01-27 PROCEDURE — 99231 SBSQ HOSP IP/OBS SF/LOW 25: CPT

## 2024-01-27 RX ADMIN — Medication 40 MILLIGRAM(S): at 08:38

## 2024-01-27 RX ADMIN — Medication 3 MILLIGRAM(S): at 20:24

## 2024-01-27 RX ADMIN — ARIPIPRAZOLE 2 MILLIGRAM(S): 15 TABLET ORAL at 08:38

## 2024-01-27 NOTE — BH INPATIENT PSYCHIATRY PROGRESS NOTE - NSBHASSESSSUMMFT_PSY_ALL_CORE
ASSESSMENT  Patient is a 23-year-old male, single, non-caregiver, college student at [employer], no significant PMHx, PPHx of MDD with anxious distress, prior suicide gesture/attempt by OD, currently in outpatient treatment (Dr. Gricelda Lazar), who presents to crisis center with family due to 6 weeks of decline in functioning and depressive symptoms and recent impulsive suicidal behavior and preparatory actions in the setting of medication changes and psychosocial stressors     1/27  Patient reports feeling less hopeless and denies passive and active SI today    Patient is at acute risk of harm to self and would benefit from inpatient treatment for medication optimization, stabilization of symptoms, and psychotherapy as appropriate.       PLAN  Legal  - admitted on 9.13 voluntary    Safety  - routine obs    Psychiatric  - INC abilify to 2 mg qdaily  - c/w fluoxetine to 40mg starting 1/26  - melatonin 3mg PO qHS   PRN  - hydroxyzine 25mg PO q6 PRN for restlessness  - diphenhydramine 50mg IM PRN for agitation  - lorazepam 2mg IM PRN for agitation     Medical  PRN  - acetaminphen 650mg PO q6 PRN for pain or fever  - maalox 30mL PO q6 PRN for dyspepsia  - magnesium hydroxide 30ML PO daily PRN for constipation  Other  - I/G/M  - collateral from family  - per pt's psychiatrist Dr. Gricelda Lazar: [phone] recommended taper and d/c Wellbutrin, titrate Abilify to 2mg and titrate Prozac.     Dispo  - anticipate home with potential IOP/PHP

## 2024-01-27 NOTE — BH INPATIENT PSYCHIATRY PROGRESS NOTE - CURRENT MEDICATION
MEDICATIONS  (STANDING):  ARIPiprazole 2 milliGRAM(s) Oral daily  FLUoxetine 40 milliGRAM(s) Oral daily  melatonin. 3 milliGRAM(s) Oral at bedtime    MEDICATIONS  (PRN):  acetaminophen     Tablet .. 650 milliGRAM(s) Oral every 6 hours PRN Temp greater or equal to 38C (100.4F), Mild Pain (1 - 3)  ALPRAZolam 0.5 milliGRAM(s) Oral daily PRN for anxiety  aluminum hydroxide/magnesium hydroxide/simethicone Suspension 30 milliLiter(s) Oral every 6 hours PRN Dyspepsia  diphenhydrAMINE Injectable 50 milliGRAM(s) IntraMuscular once PRN agitation  hydrOXYzine hydrochloride 25 milliGRAM(s) Oral every 6 hours PRN Restlessness  LORazepam   Injectable 2 milliGRAM(s) IntraMuscular once PRN agitation  magnesium hydroxide Suspension 30 milliLiter(s) Oral daily PRN Constipation

## 2024-01-27 NOTE — BH INPATIENT PSYCHIATRY PROGRESS NOTE - MSE UNSTRUCTURED FT
On exam today the patient is generally cooperative.    Speech is clear and of normal rate.    Thought process: linear and goal directed.    Thought content: with no evidence of false beliefs or obsessions.   Perception: Denies hearing voices or other perceptual disturbances   Mood: Describes as "less hopeless".  Affect: flat.    Patient denies both passive and active suicidal ideation, intention, and plan.    Patient denies active aggressive/homicidal ideation, intent, or plan.   Patient is Alert and oriented.   Patient is cognitively grossly intact.   Fund of knowledge is fair. Memory is intact.  Insight and judgment are limited. Impulse control is intact at this time.    Vital signs are stable.

## 2024-01-28 PROCEDURE — 99231 SBSQ HOSP IP/OBS SF/LOW 25: CPT

## 2024-01-28 RX ADMIN — ARIPIPRAZOLE 2 MILLIGRAM(S): 15 TABLET ORAL at 09:24

## 2024-01-28 RX ADMIN — Medication 3 MILLIGRAM(S): at 20:40

## 2024-01-28 RX ADMIN — Medication 40 MILLIGRAM(S): at 09:24

## 2024-01-28 NOTE — BH INPATIENT PSYCHIATRY PROGRESS NOTE - MSE UNSTRUCTURED FT
On exam today the patient is generally cooperative.    Speech is clear and of normal rate.    Thought process: linear and goal directed.    Thought content: with no evidence of false beliefs or obsessions.   Perception: Denies hearing voices or other perceptual disturbances   Mood: Describes as "less depressed".  Affect: flat.    Patient denies both passive and active suicidal ideation, intention, and plan.    Patient denies active aggressive/homicidal ideation, intent, or plan.   Patient is Alert and oriented.   Patient is cognitively grossly intact.   Fund of knowledge is fair. Memory is intact.  Insight and judgment are limited. Impulse control is intact at this time.    Vital signs are stable.

## 2024-01-28 NOTE — BH INPATIENT PSYCHIATRY PROGRESS NOTE - NSBHASSESSSUMMFT_PSY_ALL_CORE
ASSESSMENT  Patient is a 23-year-old male, single, non-caregiver, college student at [employer], no significant PMHx, PPHx of MDD with anxious distress, prior suicide gesture/attempt by OD, currently in outpatient treatment (Dr. Gricelda Lazar), who presents to crisis center with family due to 6 weeks of decline in functioning and depressive symptoms and recent impulsive suicidal behavior and preparatory actions in the setting of medication changes and psychosocial stressors     1/28  Patient reports feeling less depressed and more hopeful and denies passive and active SI today    Patient is at acute risk of harm to self and would benefit from inpatient treatment for medication optimization, stabilization of symptoms, and psychotherapy as appropriate.       PLAN  Legal  - admitted on 9.13 voluntary    Safety  - routine obs    Psychiatric  - INC abilify to 2 mg qdaily  - c/w fluoxetine to 40mg starting 1/26  - melatonin 3mg PO qHS   PRN  - hydroxyzine 25mg PO q6 PRN for restlessness  - diphenhydramine 50mg IM PRN for agitation  - lorazepam 2mg IM PRN for agitation     Medical  PRN  - acetaminphen 650mg PO q6 PRN for pain or fever  - maalox 30mL PO q6 PRN for dyspepsia  - magnesium hydroxide 30ML PO daily PRN for constipation  Other  - I/G/M  - collateral from family  - per pt's psychiatrist Dr. Gricelda Lazar: [phone] recommended taper and d/c Wellbutrin, titrate Abilify to 2mg and titrate Prozac.     Dispo  - anticipate home with potential IOP/PHP

## 2024-01-28 NOTE — BH INPATIENT PSYCHIATRY PROGRESS NOTE - NSBHTIMEACTIVITIESPERFORMED_PSY_A_CORE
25 minutes spent with patient including interviewing, charting and interdisciplinary team meeting
25 minutes spent with patient including interviewing, charting and interdisciplinary team meeting

## 2024-01-29 PROCEDURE — 90853 GROUP PSYCHOTHERAPY: CPT

## 2024-01-29 PROCEDURE — 99232 SBSQ HOSP IP/OBS MODERATE 35: CPT

## 2024-01-29 RX ADMIN — Medication 0.5 MILLIGRAM(S): at 20:37

## 2024-01-29 RX ADMIN — ARIPIPRAZOLE 2 MILLIGRAM(S): 15 TABLET ORAL at 08:22

## 2024-01-29 RX ADMIN — Medication 3 MILLIGRAM(S): at 20:37

## 2024-01-29 RX ADMIN — Medication 40 MILLIGRAM(S): at 08:22

## 2024-01-29 NOTE — BH INPATIENT PSYCHIATRY PROGRESS NOTE - NSBHASSESSSUMMFT_PSY_ALL_CORE
ASSESSMENT  Patient is a 23-year-old male, single, non-caregiver, college student at [employer], no significant PMHx, PPHx of MDD with anxious distress, prior suicide gesture/attempt by OD, currently in outpatient treatment (Dr. Gricelda Lazar), who presents to crisis center with family due to 6 weeks of decline in functioning and depressive symptoms and recent impulsive suicidal behavior and preparatory actions in the setting of medication changes and psychosocial stressors     Patient continues to improve, had significant improvement over the weekend, now reporting euthymic mood, denies SI or urges to self injure. Submitted 3DL today, likely planning for dispo Wednesday.     PLAN  Legal  - admitted on 9.13 voluntary    Safety  - routine obs    Psychiatric  - INC abilify to 2 mg qdaily  - c/w fluoxetine to 40mg starting 1/26  - melatonin 3mg PO qHS   PRN  - hydroxyzine 25mg PO q6 PRN for restlessness  - diphenhydramine 50mg IM PRN for agitation  - lorazepam 2mg IM PRN for agitation     Medical  PRN  - acetaminphen 650mg PO q6 PRN for pain or fever  - maalox 30mL PO q6 PRN for dyspepsia  - magnesium hydroxide 30ML PO daily PRN for constipation  Other  - I/G/M  - collateral from family  - per pt's psychiatrist Dr. Gricelda Lazar: [phone] recommended taper and d/c Wellbutrin, titrate Abilify to 2mg and titrate Prozac.     Dispo  - anticipate home with potential IOP/PHP

## 2024-01-29 NOTE — BH INPATIENT PSYCHIATRY PROGRESS NOTE - MSE UNSTRUCTURED FT
On exam today the patient is generally cooperative.    Speech is clear and of normal rate.    Thought process: linear and goal directed.    Thought content: with no evidence of false beliefs or obsessions.  Perception: Denies hearing voices or other perceptual disturbances  Mood: Describes as "good".  Affect: bright    Patient denies both passive and active suicidal ideation, intention, and plan.    Patient denies active aggressive/homicidal ideation, intent, or plan.   Patient is Alert and oriented.   Patient is cognitively grossly intact.   Fund of knowledge is fair. Memory is intact.  Insight and judgment are limited.   Impulse control is intact at this time.    Vital signs are stable.

## 2024-01-30 PROCEDURE — 99232 SBSQ HOSP IP/OBS MODERATE 35: CPT

## 2024-01-30 PROCEDURE — 90853 GROUP PSYCHOTHERAPY: CPT

## 2024-01-30 RX ORDER — HYDROXYZINE HCL 10 MG
1 TABLET ORAL
Qty: 14 | Refills: 0
Start: 2024-01-30 | End: 2024-02-12

## 2024-01-30 RX ORDER — ARIPIPRAZOLE 15 MG/1
0.5 TABLET ORAL
Refills: 0 | DISCHARGE

## 2024-01-30 RX ORDER — HYDROXYZINE HCL 10 MG
1 TABLET ORAL
Refills: 0 | DISCHARGE

## 2024-01-30 RX ORDER — FLUOXETINE HCL 10 MG
1 CAPSULE ORAL
Refills: 0 | DISCHARGE

## 2024-01-30 RX ORDER — BUPROPION HYDROCHLORIDE 150 MG/1
1 TABLET, EXTENDED RELEASE ORAL
Refills: 0 | DISCHARGE

## 2024-01-30 RX ORDER — ARIPIPRAZOLE 15 MG/1
1 TABLET ORAL
Qty: 20 | Refills: 0
Start: 2024-01-30 | End: 2024-02-18

## 2024-01-30 RX ORDER — FLUOXETINE HCL 10 MG
1 CAPSULE ORAL
Qty: 20 | Refills: 0
Start: 2024-01-30 | End: 2024-02-18

## 2024-01-30 RX ADMIN — ARIPIPRAZOLE 2 MILLIGRAM(S): 15 TABLET ORAL at 10:19

## 2024-01-30 RX ADMIN — Medication 3 MILLIGRAM(S): at 21:06

## 2024-01-30 RX ADMIN — Medication 40 MILLIGRAM(S): at 10:19

## 2024-01-30 NOTE — BH INPATIENT PSYCHIATRY PROGRESS NOTE - NSTXSLPPATGOAL_PSY_ALL_CORE
Be able to sleep for a minimum of six hours daily

## 2024-01-30 NOTE — BH DISCHARGE NOTE NURSING/SOCIAL WORK/PSYCH REHAB - PATIENT PORTAL LINK FT
You can access the FollowMyHealth Patient Portal offered by [facility] by registering at the following website: [email or web address]. By joining [employer]’s FollowMyHealth portal, you will also be able to view your health information using other applications (apps) compatible with our system.

## 2024-01-30 NOTE — BH DISCHARGE NOTE NURSING/SOCIAL WORK/PSYCH REHAB - DISCHARGE INSTRUCTIONS AFTERCARE APPOINTMENTS
In order to check the location, date, or time of your aftercare appointment, please refer to your Discharge Instructions Document given to you upon leaving the hospital.  If you have lost the instructions please call [phone]

## 2024-01-30 NOTE — BH PSYCHOLOGY - CLINICIAN PSYCHOTHERAPY NOTE - TOKEN PULL-DIAGNOSIS
Primary Diagnosis:  Major depressive episode [F32.9]        Problem Dx:   
Primary Diagnosis:  Major depressive episode [F32.9]        Problem Dx:

## 2024-01-30 NOTE — BH DISCHARGE NOTE NURSING/SOCIAL WORK/PSYCH REHAB - NSDCPRGOAL_PSY_ALL_CORE
During the current hospitalization patient has been addressing psychiatric rehabilitation goals pertaining to identifying coping skills to decrease SI, anxiety, depression. Patient has demonstrated progress towards psychiatric rehabilitation goals during the current hospitalization. Patient exhibited progress through participating in individual and group therapy and developing additional coping skills to assist with improving mood. Pt attended group therapy 85% of the time. Pt participated intermittently in group therapy. Upon discharge, pt‘s symptoms have attenuated categorically; denied SI/HI. Pt has been managing symptoms with coping skills such as regulating emotions with writing poetry, journaling,  distract skills,  grounding, STOP/ Pros and Cons, listening to music, singing, socialization, journaling, checking the facts, Wise mind, Opposite action, and self validation. Writer encouraged patient to continue to strengthen and practice effective skills acquisition. Patient was compliant with medication during current hospitalization and committed to do so post discharge. Denied SI/HI. Pt is future oriented and plans to return to College.    pt is being discharged on a three day letter. During the current hospitalization patient has been addressing psychiatric rehabilitation goals pertaining to identifying coping skills to decrease SI, anxiety, depression. Patient has demonstrated progress towards psychiatric rehabilitation goals during the current hospitalization. Patient exhibited progress through participating in individual and group therapy and developing additional coping skills to assist with improving mood. Pt attended group therapy 85% of the time. Pt participated intermittently in group therapy. Upon discharge, pt‘s symptoms have attenuated categorically; denied SI/HI. Pt has been managing symptoms with coping skills such as regulating emotions with writing poetry, journaling,  distract skills,  grounding, STOP/ Pros and Cons, listening to music, singing, socialization, journaling, checking the facts, Wise mind, Opposite action, and self validation. Writer encouraged patient to continue to strengthen and practice effective skills acquisition. Patient was compliant with medication during current hospitalization and committed to do so post discharge. Denied SI/HI. Pt is future oriented and plans to return to College.

## 2024-01-30 NOTE — BH INPATIENT PSYCHIATRY PROGRESS NOTE - MSE UNSTRUCTURED FT
On exam today the patient is generally cooperative.    Speech is clear and of normal rate.    Thought process: linear and goal directed.    Thought content: with no evidence of false beliefs or obsessions.  Perception: Denies hearing voices or other perceptual disturbances  Mood: Describes as "down".  Affect: bright    Patient denies both passive and active suicidal ideation, intention, and plan.    Patient denies active aggressive/homicidal ideation, intent, or plan.   Patient is Alert and oriented.   Patient is cognitively grossly intact.   Fund of knowledge is fair. Memory is intact.  Insight and judgment are limited.   Impulse control is intact at this time.    Vital signs are stable.

## 2024-01-30 NOTE — BH INPATIENT PSYCHIATRY PROGRESS NOTE - NSBHASSESSSUMMFT_PSY_ALL_CORE
ASSESSMENT  Patient is a 23-year-old male, single, non-caregiver, college student at [employer], no significant PMHx, PPHx of MDD with anxious distress, prior suicide gesture/attempt by OD, currently in outpatient treatment (Dr. Gricelda Lazar), who presents to crisis center with family due to 6 weeks of decline in functioning and depressive symptoms and recent impulsive suicidal behavior and preparatory actions in the setting of medication changes and psychosocial stressors     Patient continues to improve, having overall reasonable response to breakup last night, no associated SI, planning for dc to outpatient tomorrow.     PLAN  Legal  - admitted on 9.13 voluntary    Safety  - routine obs    Psychiatric  - INC abilify to 2 mg qdaily  - c/w fluoxetine to 40mg starting 1/26  - melatonin 3mg PO qHS   PRN  - hydroxyzine 25mg PO q6 PRN for restlessness  - diphenhydramine 50mg IM PRN for agitation  - lorazepam 2mg IM PRN for agitation     Medical  PRN  - acetaminphen 650mg PO q6 PRN for pain or fever  - maalox 30mL PO q6 PRN for dyspepsia  - magnesium hydroxide 30ML PO daily PRN for constipation  Other  - I/G/M  - collateral from family  - per pt's psychiatrist Dr. Gricelda Lazar: [phone] recommended taper and d/c Wellbutrin, titrate Abilify to 2mg and titrate Prozac.     Dispo  - anticipate home with potential IOP/PHP

## 2024-01-30 NOTE — BH INPATIENT PSYCHIATRY PROGRESS NOTE - NSBHATTESTBILLING_PSY_A_CORE
07764-Kjtyaunjyg OBS or IP - low complexity OR 25-34 mins
97770-Qiwelfoait OBS or IP - moderate complexity OR 35-49 mins
97834-Mvpgbizgvm OBS or IP - low complexity OR 25-34 mins
42682-Lprhpmlggg OBS or IP - moderate complexity OR 35-49 mins
43999-Tltlosctpm OBS or IP - moderate complexity OR 35-49 mins
96306-Kelilnjhcc OBS or IP - moderate complexity OR 35-49 mins

## 2024-01-30 NOTE — BH INPATIENT PSYCHIATRY PROGRESS NOTE - NSICDXBHPRIMARYDX_PSY_ALL_CORE
Major depressive episode   F32.9  

## 2024-01-30 NOTE — BH INPATIENT PSYCHIATRY PROGRESS NOTE - NSTXPROBSLFCRE_PSY_ALL_CORE
SELF-CARE DEFICIT

## 2024-01-30 NOTE — BH INPATIENT PSYCHIATRY PROGRESS NOTE - NSBHCHARTREVIEWVS_PSY_A_CORE FT
Vital Signs Last 24 Hrs  T(C): 36.2 (01-29-24 @ 06:24), Max: 36.2 (01-29-24 @ 06:24)  T(F): 97.2 (01-29-24 @ 06:24), Max: 97.2 (01-29-24 @ 06:24)  HR: --  BP: --  BP(mean): --  RR: 19 (01-29-24 @ 06:24) (19 - 19)  SpO2: --    Orthostatic VS  01-29-24 @ 06:24  Lying BP: --/-- HR: --  Sitting BP: 121/68 HR: 69  Standing BP: 130/77 HR: 78  Site: --  Mode: --  Orthostatic VS  01-28-24 @ 08:16  Lying BP: --/-- HR: --  Sitting BP: 115/64 HR: 59  Standing BP: 121/59 HR: 99  Site: --  Mode: --  
Vital Signs Last 24 Hrs  T(C): 36.6 (01-28-24 @ 08:16), Max: 36.9 (01-27-24 @ 20:29)  T(F): 97.9 (01-28-24 @ 08:16), Max: 98.5 (01-27-24 @ 20:29)  HR: --  BP: --  BP(mean): --  RR: 20 (01-28-24 @ 08:16) (20 - 20)  SpO2: --    Orthostatic VS  01-28-24 @ 08:16  Lying BP: --/-- HR: --  Sitting BP: 115/64 HR: 59  Standing BP: 121/59 HR: 99  Site: --  Mode: --  Orthostatic VS  01-27-24 @ 07:52  Lying BP: --/-- HR: --  Sitting BP: 127/71 HR: 60  Standing BP: 119/60 HR: 102  Site: --  Mode: --  
Vital Signs Last 24 Hrs  T(C): 36.6 (01-27-24 @ 07:52), Max: 36.7 (01-26-24 @ 20:27)  T(F): 97.8 (01-27-24 @ 07:52), Max: 98 (01-26-24 @ 20:27)  HR: --  BP: --  BP(mean): --  RR: 18 (01-27-24 @ 07:52) (18 - 18)  SpO2: --    Orthostatic VS  01-27-24 @ 07:52  Lying BP: --/-- HR: --  Sitting BP: 127/71 HR: 60  Standing BP: 119/60 HR: 102  Site: --  Mode: --  Orthostatic VS  01-26-24 @ 08:16  Lying BP: --/-- HR: --  Sitting BP: 120/71 HR: 74  Standing BP: 109/62 HR: 108  Site: --  Mode: --  
Vital Signs Last 24 Hrs  T(C): 36.5 (01-30-24 @ 06:13), Max: 36.7 (01-29-24 @ 20:40)  T(F): 97.7 (01-30-24 @ 06:13), Max: 98 (01-29-24 @ 20:40)  HR: --  BP: --  BP(mean): --  RR: 16 (01-30-24 @ 06:13) (16 - 16)  SpO2: --    Orthostatic VS  01-30-24 @ 06:13  Lying BP: --/-- HR: --  Sitting BP: 119/78 HR: 59  Standing BP: 123/77 HR: 90  Site: --  Mode: --  Orthostatic VS  01-29-24 @ 06:24  Lying BP: --/-- HR: --  Sitting BP: 121/68 HR: 69  Standing BP: 130/77 HR: 78  Site: --  Mode: --  
Vital Signs Last 24 Hrs  T(C): 36.4 (01-25-24 @ 07:34), Max: 36.4 (01-25-24 @ 07:34)  T(F): 97.6 (01-25-24 @ 07:34), Max: 97.6 (01-25-24 @ 07:34)  HR: --  BP: --  BP(mean): --  RR: --  SpO2: --    Orthostatic VS  01-25-24 @ 07:34  Lying BP: --/-- HR: --  Sitting BP: 110/76 HR: 81  Standing BP: 112/65 HR: 110  Site: --  Mode: --  
Vital Signs Last 24 Hrs  T(C): 36.6 (01-26-24 @ 08:16), Max: 37.1 (01-25-24 @ 19:58)  T(F): 97.9 (01-26-24 @ 08:16), Max: 98.7 (01-25-24 @ 19:58)  HR: --  BP: --  BP(mean): --  RR: 16 (01-26-24 @ 08:16) (16 - 16)  SpO2: --    Orthostatic VS  01-26-24 @ 08:16  Lying BP: --/-- HR: --  Sitting BP: 120/71 HR: 74  Standing BP: 109/62 HR: 108  Site: --  Mode: --  Orthostatic VS  01-25-24 @ 07:34  Lying BP: --/-- HR: --  Sitting BP: 110/76 HR: 81  Standing BP: 112/65 HR: 110  Site: --  Mode: --

## 2024-01-30 NOTE — BH INPATIENT PSYCHIATRY PROGRESS NOTE - NSTXPROBCOPE_PSY_ALL_CORE
COPING, INEFFECTIVE
Complex Repair And V-Y Plasty Text: The defect edges were debeveled with a #15 scalpel blade.  The primary defect was closed partially with a complex linear closure.  Given the location of the remaining defect, shape of the defect and the proximity to free margins a V-Y plasty was deemed most appropriate for complete closure of the defect.  Using a sterile surgical marker, an appropriate advancement flap was drawn incorporating the defect and placing the expected incisions within the relaxed skin tension lines where possible.    The area thus outlined was incised deep to adipose tissue with a #15 scalpel blade.  The skin margins were undermined to an appropriate distance in all directions utilizing iris scissors.

## 2024-01-30 NOTE — BH INPATIENT PSYCHIATRY PROGRESS NOTE - PRN MEDS
MEDICATIONS  (PRN):  acetaminophen     Tablet .. 650 milliGRAM(s) Oral every 6 hours PRN Temp greater or equal to 38C (100.4F), Mild Pain (1 - 3)  ALPRAZolam 0.5 milliGRAM(s) Oral daily PRN for anxiety  aluminum hydroxide/magnesium hydroxide/simethicone Suspension 30 milliLiter(s) Oral every 6 hours PRN Dyspepsia  diphenhydrAMINE Injectable 50 milliGRAM(s) IntraMuscular once PRN agitation  hydrOXYzine hydrochloride 25 milliGRAM(s) Oral every 6 hours PRN Restlessness  LORazepam   Injectable 2 milliGRAM(s) IntraMuscular once PRN agitation  magnesium hydroxide Suspension 30 milliLiter(s) Oral daily PRN Constipation  

## 2024-01-30 NOTE — BH INPATIENT PSYCHIATRY PROGRESS NOTE - NSBHFUPINTERVALCCFT_PSY_A_CORE
"I think Im doing better and adjusting pretty well to the Unit here" 
Patient seen for follow up borderline personality disorder.  
"I tried to kill myself" 
"I feel defeated"
Patient seen for follow up borderline personality disorder.  
"Eloina been both in my room reading and out in the day room as well!"

## 2024-01-30 NOTE — BH INPATIENT PSYCHIATRY PROGRESS NOTE - NSBHCHARTREVIEWLAB_PSY_A_CORE FT
LABS:    01-24    139  |  100  |  16  ----------------------------<  92  3.7   |  28  |  1.08    Ca    10.1      24 Jan 2024 19:18      

## 2024-01-30 NOTE — BH INPATIENT PSYCHIATRY PROGRESS NOTE - NSBHMETABOLIC_PSY_ALL_CORE_FT
BMI:   HbA1c: A1C with Estimated Average Glucose Result: 4.7 % (01-24-24 @ 19:18)    Glucose:   BP: --Vital Signs Last 24 Hrs  T(C): 36.6 (01-26-24 @ 08:16), Max: 37.1 (01-25-24 @ 19:58)  T(F): 97.9 (01-26-24 @ 08:16), Max: 98.7 (01-25-24 @ 19:58)  HR: --  BP: --  BP(mean): --  RR: 16 (01-26-24 @ 08:16) (16 - 16)  SpO2: --    Orthostatic VS  01-26-24 @ 08:16  Lying BP: --/-- HR: --  Sitting BP: 120/71 HR: 74  Standing BP: 109/62 HR: 108  Site: --  Mode: --  Orthostatic VS  01-25-24 @ 07:34  Lying BP: --/-- HR: --  Sitting BP: 110/76 HR: 81  Standing BP: 112/65 HR: 110  Site: --  Mode: --    Lipid Panel: Date/Time: 01-24-24 @ 19:18  Cholesterol, Serum: 169  LDL Cholesterol Calculated: 93  HDL Cholesterol, Serum: 55  Total Cholesterol/HDL Ration Measurement: --  Triglycerides, Serum: 106  
BMI:   HbA1c: A1C with Estimated Average Glucose Result: 4.7 % (01-24-24 @ 19:18)    Glucose:   BP: --Vital Signs Last 24 Hrs  T(C): 36.4 (01-25-24 @ 07:34), Max: 36.4 (01-25-24 @ 07:34)  T(F): 97.6 (01-25-24 @ 07:34), Max: 97.6 (01-25-24 @ 07:34)  HR: --  BP: --  BP(mean): --  RR: --  SpO2: --    Orthostatic VS  01-25-24 @ 07:34  Lying BP: --/-- HR: --  Sitting BP: 110/76 HR: 81  Standing BP: 112/65 HR: 110  Site: --  Mode: --    Lipid Panel: Date/Time: 01-24-24 @ 19:18  Cholesterol, Serum: 169  LDL Cholesterol Calculated: 93  HDL Cholesterol, Serum: 55  Total Cholesterol/HDL Ration Measurement: --  Triglycerides, Serum: 106  
BMI:   HbA1c: A1C with Estimated Average Glucose Result: 4.7 % (01-24-24 @ 19:18)    Glucose:   BP: --Vital Signs Last 24 Hrs  T(C): 36.5 (01-30-24 @ 06:13), Max: 36.7 (01-29-24 @ 20:40)  T(F): 97.7 (01-30-24 @ 06:13), Max: 98 (01-29-24 @ 20:40)  HR: --  BP: --  BP(mean): --  RR: 16 (01-30-24 @ 06:13) (16 - 16)  SpO2: --    Orthostatic VS  01-30-24 @ 06:13  Lying BP: --/-- HR: --  Sitting BP: 119/78 HR: 59  Standing BP: 123/77 HR: 90  Site: --  Mode: --  Orthostatic VS  01-29-24 @ 06:24  Lying BP: --/-- HR: --  Sitting BP: 121/68 HR: 69  Standing BP: 130/77 HR: 78  Site: --  Mode: --    Lipid Panel: Date/Time: 01-24-24 @ 19:18  Cholesterol, Serum: 169  LDL Cholesterol Calculated: 93  HDL Cholesterol, Serum: 55  Total Cholesterol/HDL Ration Measurement: --  Triglycerides, Serum: 106  
BMI:   HbA1c: A1C with Estimated Average Glucose Result: 4.7 % (01-24-24 @ 19:18)    Glucose:   BP: --Vital Signs Last 24 Hrs  T(C): 36.2 (01-29-24 @ 06:24), Max: 36.2 (01-29-24 @ 06:24)  T(F): 97.2 (01-29-24 @ 06:24), Max: 97.2 (01-29-24 @ 06:24)  HR: --  BP: --  BP(mean): --  RR: 19 (01-29-24 @ 06:24) (19 - 19)  SpO2: --    Orthostatic VS  01-29-24 @ 06:24  Lying BP: --/-- HR: --  Sitting BP: 121/68 HR: 69  Standing BP: 130/77 HR: 78  Site: --  Mode: --  Orthostatic VS  01-28-24 @ 08:16  Lying BP: --/-- HR: --  Sitting BP: 115/64 HR: 59  Standing BP: 121/59 HR: 99  Site: --  Mode: --    Lipid Panel: Date/Time: 01-24-24 @ 19:18  Cholesterol, Serum: 169  LDL Cholesterol Calculated: 93  HDL Cholesterol, Serum: 55  Total Cholesterol/HDL Ration Measurement: --  Triglycerides, Serum: 106  
BMI:   HbA1c: A1C with Estimated Average Glucose Result: 4.7 % (01-24-24 @ 19:18)    Glucose:   BP: --Vital Signs Last 24 Hrs  T(C): 36.6 (01-28-24 @ 08:16), Max: 36.9 (01-27-24 @ 20:29)  T(F): 97.9 (01-28-24 @ 08:16), Max: 98.5 (01-27-24 @ 20:29)  HR: --  BP: --  BP(mean): --  RR: 20 (01-28-24 @ 08:16) (20 - 20)  SpO2: --    Orthostatic VS  01-28-24 @ 08:16  Lying BP: --/-- HR: --  Sitting BP: 115/64 HR: 59  Standing BP: 121/59 HR: 99  Site: --  Mode: --  Orthostatic VS  01-27-24 @ 07:52  Lying BP: --/-- HR: --  Sitting BP: 127/71 HR: 60  Standing BP: 119/60 HR: 102  Site: --  Mode: --    Lipid Panel: Date/Time: 01-24-24 @ 19:18  Cholesterol, Serum: 169  LDL Cholesterol Calculated: 93  HDL Cholesterol, Serum: 55  Total Cholesterol/HDL Ration Measurement: --  Triglycerides, Serum: 106  
BMI:   HbA1c: A1C with Estimated Average Glucose Result: 4.7 % (01-24-24 @ 19:18)    Glucose:   BP: --Vital Signs Last 24 Hrs  T(C): 36.6 (01-27-24 @ 07:52), Max: 36.7 (01-26-24 @ 20:27)  T(F): 97.8 (01-27-24 @ 07:52), Max: 98 (01-26-24 @ 20:27)  HR: --  BP: --  BP(mean): --  RR: 18 (01-27-24 @ 07:52) (18 - 18)  SpO2: --    Orthostatic VS  01-27-24 @ 07:52  Lying BP: --/-- HR: --  Sitting BP: 127/71 HR: 60  Standing BP: 119/60 HR: 102  Site: --  Mode: --  Orthostatic VS  01-26-24 @ 08:16  Lying BP: --/-- HR: --  Sitting BP: 120/71 HR: 74  Standing BP: 109/62 HR: 108  Site: --  Mode: --    Lipid Panel: Date/Time: 01-24-24 @ 19:18  Cholesterol, Serum: 169  LDL Cholesterol Calculated: 93  HDL Cholesterol, Serum: 55  Total Cholesterol/HDL Ration Measurement: --  Triglycerides, Serum: 106

## 2024-01-30 NOTE — BH PSYCHOLOGY - CLINICIAN PSYCHOTHERAPY NOTE - NSTXANXGOAL_PSY_ALL_CORE
Report a reduction in panic attacks and improving mood and confidence
Report a reduction in panic attacks and improving mood and confidence

## 2024-01-30 NOTE — BH INPATIENT PSYCHIATRY PROGRESS NOTE - NSTXANXINTERMD_PSY_ALL_CORE
Med management with Prozac, Abilify

## 2024-01-30 NOTE — BH INPATIENT PSYCHIATRY PROGRESS NOTE - NSTXSLPPATINTERMD_PSY_ALL_CORE
Med management w melatonin

## 2024-01-30 NOTE — BH PSYCHOLOGY - CLINICIAN PSYCHOTHERAPY NOTE - NSBHPSYCHOLNARRATIVE_PSY_A_CORE FT
Writer met with patient for individual therapy session at the recommendation of the treatment team. Pt was alert, oriented x4 and cooperative with session. Pt denied SHIIP and AVH. Pt reports mood as "not great" and affect is euthymic, congruent and full-range. No PMA or PMR is observed and speech is of normal rate, rhythm and volume. Pt maintains appropriate eye contact. Impulse control is poor by history, and pt's insight and judgment are good.     Focus of session is on consolidating treatment gains, terminating treatment relationship effectively, and coping ahead for effective discharge. Pt engaged in discussion of skills he could use to manage transition home, including interpersonal effectiveness and distress tolerance. Pt shared that his girlfriend broke up with him yesterday and that he was upset about that, but denied thoughts of self-harm or suicidal behavior. Focus of session was on generating safety plan and coping ahead for effective discharge. With encouragement, pt identified warning signs, coping skills, and external supports that he can use to maintain safety and stability outside of the hospital after discharge. Discussion also centered on how pt can help keep the environment safe. Patient shared that he plans to continue seeing his therapist at school after discharge. Please see  Safety Plan for further detail. 
Writer met with patient for individual therapy session at the recommendation of the treatment team. Pt was alert, oriented x4 and cooperative with session. Pt denied SHIIP and AVH. Pt reports mood as "all right" and affect is euthymic, congruent and full-range. No PMA or PMR is observed and speech is of normal rate, rhythm and volume. Pt maintains appropriate eye contact. Impulse control is poor by history, and pt's insight and judgment are good.     Session focused on building rapport and identifying factors that contributed to hospitalization. Patient shared that he has experience with DBT but had difficulty accessing skills on the night of his attempt. Patient committed to remaining safe on the unit and agreed to inform staff if urges to self-harm or suicidal ideation became active.  Pt is also oriented to CAPS protocol on 1S and commits to safety.

## 2024-01-30 NOTE — BH INPATIENT PSYCHIATRY PROGRESS NOTE - NSTXSLFCREGOAL_PSY_ALL_CORE
Be able to demonstrate the ability to care for self in 2 areas such as feeding oneself and hygiene

## 2024-01-30 NOTE — BH PSYCHOLOGY - CLINICIAN PSYCHOTHERAPY NOTE - NSTXDCSOCGOAL_PSY_ALL_CORE
Will accept referrals to support groups, clubhouse, senior centers, etc.
Will accept referrals to support groups, clubhouse, senior centers, etc.

## 2024-01-30 NOTE — BH INPATIENT PSYCHIATRY PROGRESS NOTE - NSBHFUPINTERVALHXFT_PSY_A_CORE
Chart reviewed, case discussed in team. No acute events overnight or over the weekend, patient appears brighter, reports he is engaging well. Patient denies SI or urges to self injure. Discussed pros and cons of dc and patient ultimately put in 3DL, as he wishes to leave by Wednesday. Discussed DBT and BPD with patient. Spoke to family by phone today, to discuss dx and updates, denying acute safety concerns. Amenable with plan and took well to psychoed. 
Patient seen for follow up of depression  Chart reviewed and case discussed with nursing staff  No acute events overnight.   Patient reports slow improvement and is more hopeful on exam today after his overdose attempt  Feels that talking with peers here has been helpful  Tolerating meds well
Chart reviewed, case discussed in team. Patient reports that yesterday in evening he had a phone conversation that resulted in him breaking up with his girlfriend, he feels this was mutual but is somewhat down as a result, though he feels it was the right place to do this in a controlled and monitored setting. He denies any associated SI or urges to self injure though does endorse low mood today.  Patient denies SI or urges to self injure. Discussed updates with family and patient re aftercare plan, aiming to dc tomorrow. 
Chart reviewed, and case discussed with team. There were no events overnight, and the patient did not require PRNs for agitation, or restraints. Patient has been taking their scheduled medication without issue. Vital signs stable. Sleep log not yet updated with patient information.     Patient seen this morning in his room, and interviewed in the dayroom. He states that for the past 2 days, he has been decreased, following the sudden onset of an "overwhelming urge to kill [him]self." He clarifies that it feels like a "chemical" problem as he was doing relatively fine until a recent med change; he recently was started on Wellbutrin in addition to Prozac and Abilify for augmentation of depression, which caused him to be "activated" and anxious, along with notable side effects of increased libido. 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 Coupled with recent relationship difficulties due to his girlfriend's parents, the patient describes feeling "hopeless and defeated" and with the realization that suicide was the "easy way out." He has had suicidal thoughts before, but none as suddenly and strongly as this episode.     He gathered pills alcohol and wrote a suicide letter, then proceeded to visit his girlfriend at her painting studio, where she noticed he was unwell. Following him home, she discovered the alcohol and suicide letter and notified his parents. Upon further reflection, the patient admits that despite feeling an overwhelming urge to die, the decision to visit his girlfriend was an unconscious desire to "cry out for help." He states that his girlfriend and parents are a great support system for him, despite feeling like a "burden" to them and "torturing" them with his issues and putting them through these suicidal gestures. States that he previously had a group of close best friends but since his last suicide attempt last year, they are no longer as close. Patient explains that he has a "romanticization" of suicide, with a fascination with celebrities and public figures who have  either intentionally or unintentionally by their own actions such as overdose.     Patient continues to be ambivalent about his desire to die, and though he admits continued thoughts of death, he denies active SI at this time. Also denies HI or AVH. Endorses recent poor sleep and poor appetite, though he has chronic issues with nausea and keeping food down. Denies any history of cornel. Endorses feeling sensitive to rejection and often overthinks, but denies history of unstable or intense interpersonal relationships, affective instability due to marked reactivity, or significant identity disturbance although he reports not knowing who he is yet. Denies NSSIB in the form of cutting or anything physical as he sees his physical body as sacred but admits to harming self via alcohol and drugs. Previously engaged in [facility] which he reports was very helpful. 
Patient seen and evaluated, staff consulted, chart, labs, meds reviewed. No acute events overnight.     Pt corroborates history obtained in previous Assessment note, appears ambivalent about whether or not he wants to live. States that what stopped him from attempting suicide was his girlfriend finding his suicide note. Of note, patient states "I guess I wanted her to save me" when asked how he felt when she found his suicide note. +guilt, +depressed mood, +anhedonia and +poor appetite and sleep. Sxs appear chronic, albeit worsened acutely after trial of bupropion and exacerbated by recent stress of losing an internship, feeling directionless w/ regard to his future career. Denies current SI, HI, AVH. 
Patient seen for follow up of depression  Chart reviewed and case discussed with nursing staff  No acute events overnight.   Patient less hopeless on exam today after his overdose attempt  Feels that talking with peers here has been helpful  Reports that he feels better off of the buproprion and back on the aripiprazole at a higher dose

## 2024-01-31 VITALS — RESPIRATION RATE: 18 BRPM | TEMPERATURE: 97 F

## 2024-01-31 PROCEDURE — 90853 GROUP PSYCHOTHERAPY: CPT

## 2024-01-31 PROCEDURE — 99239 HOSP IP/OBS DSCHRG MGMT >30: CPT

## 2024-01-31 RX ADMIN — Medication 40 MILLIGRAM(S): at 09:39

## 2024-01-31 RX ADMIN — ARIPIPRAZOLE 2 MILLIGRAM(S): 15 TABLET ORAL at 09:39

## 2024-01-31 NOTE — BH INPATIENT PSYCHIATRY DISCHARGE NOTE - NSDCMRMEDTOKEN_GEN_ALL_CORE_FT
ARIPiprazole 2 mg oral tablet: 1 tab(s) orally once a day  FLUoxetine 40 mg oral capsule: 1 cap(s) orally once a day  hydrOXYzine hydrochloride 25 mg oral tablet: 1 tab(s) orally once a day as needed for  anxiety  Xanax 0.5 mg oral tablet: 1 tab(s) orally once a day as needed for  anxiety

## 2024-01-31 NOTE — BH INPATIENT PSYCHIATRY DISCHARGE NOTE - HOSPITAL COURSE
Dates of Hospitalization: 1/24/24-1/31/24    On presentation the unit the patient was endorsing mood dysregulation, hopelessness and though reported SI prior to admit did not report SI once transferred to  from [facility]. Patient was engaged in review of BPD criteria and found to meet criteria for the dx, was formally provided with the diagnosis which he had already suspected he had, and further engaged in psychoed re etiology, modifying factors and prognosis. Patient took well to this discussion. Further discussed this with family as well who also took well to the discussion.     On admit on low 3 (patient was given Abilify 2mg in addition to his home meds of prozac 40mg and xanax PRN, as patient liked the Abilify it was continued at this dose but with a limited expectation of benefit given the underlying dx of borderline personality disorder. Patient also educated about limited utility of meds in improving BPD sx burden.     Over the course of hospitalization the patient was engaged in individual and group therapy and engaged well with treatment team. He grew increasingly optimistic about his future and he felt better about increasing tx frequency and integrating IOP elements in his tx. Patient then submitted a 3DL to request dc as he was eager to begin outpatient treatment, be with family, and improve his relationships with friends he feels he had been neglecting due to sx burden prior to admit. Discussed safety planning and contingency planning with pt and family all of whom denied acute safety concerns related to dispo thus was dc home to outpatient tx.     On the day of dc the patient bright, sleeping well, denying notable anxiety, denying SI/HI/VI or urges to self injure. He was forward thinking and genuinely engaged with team. He endorsed euthymic mood and was in good behavioral control.     At the time of dc the patient and family were engaged in safety planning and denied safety concerns related to dispo, they verbalized willingness to call 911 or go to ER in the event that patient was felt to be a threat to self or others.      Risk Assessment: The patient is at elevated chronic risk for suicide/self harm/ not particularly violence, but low acute risk for suicide/self harm/violence. Static risk factors include; psychiatric illness dx/BPD, hx of hospitalization, history of SA, no real hx of significant SIB, nor hx of Violence, but with hx of occasional substance abuse. Acute risk factors present on admission but mitigated during hospitalization include; active SI, anxiety, limited insight, acute stressors. Acute risk factors were mitigated during admission via medication tx, DBT, I/G/M therapy, safety planning, and psychoeducation. Protective factors include; good response to tx, forward thinking regarding returning to family, getting back to spending more time w friends, eventual return to school, has supportive family, engaged in tx. Given protective factors, and that acute risk factors present on admission have been addressed and are no longer present at dc, patient has returned to baseline level of acute risk and the patient no longer requires inpatient hospitalization for stabilization or safety. The patient is therefore appropriate for dc to outpatient care. Chronic risk can be further mitigated by continued engagement with outpatient tx, consideration for DBT program if insufficient response in IOP/outpatient, if it becomes problematic, consideration for substance tx. Would also attempt to limit bzd use if possible given it was a part of his stated SI prior to admit. At the time of dc the patient engaged meaningfully in safety planning and was dc home to outpatient tx.     Though patient is appropriate for dc by either account, he did submit a 3DL and was dc as there was no evidence to suggest that patient continued to pose an acute danger to self or others while observed on the inpatient unit.     Discharge Diagnosis: Borderline personality disorder.     Discharge Medications (the patient was provided with a 20 day supply of psychiatric medications upon dc):     ARIPiprazole 2 mg oral tablet: 1 tab(s) orally once a day  FLUoxetine 40 mg oral capsule: 1 cap(s) orally once a day  hydrOXYzine hydrochloride 25 mg oral tablet: 1 tab(s) orally once a day as needed for  anxiety  Xanax 0.5 mg oral tablet: 1 tab(s) orally once a day as needed for  anxiety (no prescription given and urged to avoid if possible using at home, to first try Atarax. was also cautioned about dangers of combination of BZDs + ETOH and or other CNS depressants including risk of death. )

## 2024-01-31 NOTE — BH INPATIENT PSYCHIATRY DISCHARGE NOTE - NSBHFUPINTERVALCCFT_PSY_A_CORE
Discharge Progress Note Date and Time: 01-31-24 @ 10:03  Patient seen on day of dc for follow up for borderline personality disorder.

## 2024-01-31 NOTE — BH INPATIENT PSYCHIATRY DISCHARGE NOTE - REASON FOR ADMISSION
Anxiety and suicidal ideation in the context of borderline personality disorder and multiple community stressors.

## 2024-01-31 NOTE — BH INPATIENT PSYCHIATRY DISCHARGE NOTE - OTHER PAST PSYCHIATRIC HISTORY (INCLUDE DETAILS REGARDING ONSET, COURSE OF ILLNESS, INPATIENT/OUTPATIENT TREATMENT)
Pt transfered to college track unit. 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 ETOH use fluctuates from 2x/week to 5x/week; at most drank 10 drinks with blackouts; denies complicated withdrawals/DTs/seizures. Pt was engaged in care with [facility] 12/21/22-5/12/23. Pt was referred to [facility] following a suicidal gesture of consuming ETOH and Klonopin, his friends on campus checked on him and called campus security. Pt reports he was admitted to [facility] for about 1 week to 10 days, was COVID + at the time and received care in their medical ED vs psychiatric; currently sees psychiatrist Dr Gricelda Lazar (phone [phone] Patient presented to the [facility] seeking assessment for possible admission. Pt reports 6 weeks ago experiencing changes with his medication.

## 2024-01-31 NOTE — BH INPATIENT PSYCHIATRY DISCHARGE NOTE - NSBHASSESSSUMMFT_PSY_ALL_CORE
ASSESSMENT  Patient is a 23-year-old male, single, non-caregiver, college student at [employer], no significant PMHx, PPHx of MDD with anxious distress, prior suicide gesture/attempt by OD, currently in outpatient treatment (Dr. Gricelda Lazar), who presents to crisis center with family due to 6 weeks of decline in functioning and depressive symptoms and recent impulsive suicidal behavior and preparatory actions in the setting of medication changes and psychosocial stressors     Patient continues to improve, having overall reasonable response to breakup last night, no associated SI, planning for dc to outpatient tomorrow.     PLAN  Legal  - admitted on 9.13 voluntary    Safety  - routine obs    Psychiatric  - INC abilify to 2 mg qdaily  - c/w fluoxetine to 40mg starting 1/26  - melatonin 3mg PO qHS   PRN  - hydroxyzine 25mg PO q6 PRN for restlessness  - diphenhydramine 50mg IM PRN for agitation  - lorazepam 2mg IM PRN for agitation     Medical  PRN  - acetaminphen 650mg PO q6 PRN for pain or fever  - maalox 30mL PO q6 PRN for dyspepsia  - magnesium hydroxide 30ML PO daily PRN for constipation  Other  - I/G/M  - collateral from family  - per pt's psychiatrist Dr. Gricelda Lazar: [phone] recommended taper and d/c Wellbutrin, titrate Abilify to 2mg and titrate Prozac.     Dispo  - anticipate home with potential IOP/PHP

## 2024-01-31 NOTE — BH PSYCHOLOGY - GROUP THERAPY NOTE - NSPSYCHOLGRPDBTPT_PSY_A_CORE FT
DBT skills generalization group is a group in which patients review the skill taught the day before, and patients have the opportunity to troubleshoot the skill, engage in more practice, and share their experience using the skill. Today’s skills review group focused Mindfulness What and How skills, which are skills to help patients increase their awareness of their present experience without judgment. Each skill was reviewed, and the ways that patients practiced the skills on the unit, as well as how they can continue to do so, were exchanged with one another. 
DBT Group is a group in which patients learn skills to better manage their emotions and behaviors. Group today focused on the “mindfulness” module, which are skills to help patients increase their awareness of their present experience without judgment. Pts were taught the “what” skills (observe, describe, participate) and “how” skills (non-judgmentally, effectively, and one-mindfully) of mindfulness, and engaged in a variety of mindfulness exercises to practice the skills, and shared observations at the end of each activity. 
DBT Group is a group in which patients learn skills to better manage their emotions and behaviors. Group begins with a mindfulness practice so that patients have an opportunity to practice observing their internal and external experiences.  Following the mindfulness exercise the group learns new skills in a didactic format. Group today focused on the “distress tolerance” module, which are skills to help patients manage their crisis urges.  Specifically, pts learned the skill of “radical acceptance,” which includes accepting painful situations in their lives they cannot change through turning the mind and practicing willingness. Patients were asked to determine difficult situations in their lives they needed to work on accepting, and provided examples of ways to practice this while in the hospital. 
DBT Group is a group in which patients learn skills to better manage their emotions and behaviors. Group begins with a mindfulness practice so that patients have an opportunity to practice observing their internal and external experiences. Following the mindfulness exercise the group learns new skills in a didactic format. Group today focused on the “emotion regulation” module. Specifically, patients learned “accumulating positives,” which focused on ways to build up positive experiences in the short term to balance out negative experiences in their lives. Patients were given a pleasant activities list for ideas of positive activities they can incorporate into their everyday lives. Patients were asked to commit to pleasant activities they would engage in on the unit today to improve their moods.

## 2024-01-31 NOTE — BH INPATIENT PSYCHIATRY DISCHARGE NOTE - NSBHDCRISKMITIGATE_PSY_ALL_CORE
Safety planning/Reduction in access to lethal methods (pills, firearms, etc)/Family/Other social support involvement/DBT Program

## 2024-01-31 NOTE — BH INPATIENT PSYCHIATRY DISCHARGE NOTE - MSE UNSTRUCTURED FT
On exam today the patient is calm, bright, cooperative.    Speech is clear and of normal rate.    Thought process: linear and goal directed.    Thought content: with no evidence of false beliefs or obsessions.  Perception: Denies hearing voices or other perceptual disturbances  Mood: Describes as "good"  Affect: bright    Patient denies both passive and active suicidal ideation, intention, and plan.    Patient denies active aggressive/homicidal ideation, intent, or plan.   Patient is Alert and oriented.   Patient is cognitively grossly intact.   Fund of knowledge is fair. Memory is intact.  Insight and judgment are good  Impulse control is intact at this time.    Vital signs are stable.

## 2024-01-31 NOTE — BH PSYCHOLOGY - GROUP THERAPY NOTE - NSBHPSYCHOLGRPNAME_PSY_A_CORE
DBT Skills
DBT Homework
DBT Skills
CBT (Cognitive Behavioral Therapy) Group
DBT Skills
Statement Selected

## 2024-01-31 NOTE — BH INPATIENT PSYCHIATRY DISCHARGE NOTE - NSBHDCHANDOFFFT_PSY_ALL_CORE
Handoff including circumstance surrounding admission, past hx, hospital course, medications, and after care recs provided to outpatient MD JR, if questions regarding hospital course or patient come up following dc, please contact [facility] team at [facility] at [phone], or DR Brody [phone].

## 2024-01-31 NOTE — BH PSYCHOLOGY - GROUP THERAPY NOTE - NSPSYCHOLGRPDBTGOAL_PSY_A_CORE
reduce mood and affective lability/reduce suicidal ideation/risk of attempt/improve ability to indentify feelings/improve ability to communicate feelings/reduce vulnerability to emotional dysregualation

## 2024-01-31 NOTE — BH INPATIENT PSYCHIATRY DISCHARGE NOTE - NSBHFUPINTERVALHXFT_PSY_A_CORE
Chart reviewed, case discussed in team. No acute events overnight. Patient slept well. Continues to be bright and reports improved mood. Feels better about dx and expressed gratitude for writer discussing his diagnosis with his family. He has consistently denied SI throughout the hospitalization despite break up with girlfriend 2 days ago. Patient feels optimistic about engaging in increased psychotherapy/groups after dc. Engaged well in safety planning. Adherent with meds and denies side effects.

## 2024-01-31 NOTE — BH PSYCHOLOGY - GROUP THERAPY NOTE - NSPSYCHOLGRPDBTPROB_PSY_A_CORE
depressed mood/suicidal ideation

## 2024-01-31 NOTE — BH INPATIENT PSYCHIATRY DISCHARGE NOTE - NSBHMETABOLIC_PSY_ALL_CORE_FT
BMI:   HbA1c: A1C with Estimated Average Glucose Result: 4.7 % (01-24-24 @ 19:18)    Glucose:   BP: --Vital Signs Last 24 Hrs  T(C): 36.3 (01-31-24 @ 08:39), Max: 37 (01-30-24 @ 20:30)  T(F): 97.4 (01-31-24 @ 08:39), Max: 98.6 (01-30-24 @ 20:30)  HR: --  BP: --  BP(mean): --  RR: 18 (01-31-24 @ 08:39) (18 - 18)  SpO2: --    Orthostatic VS  01-31-24 @ 08:39  Lying BP: --/-- HR: --  Sitting BP: 132/75 HR: 73  Standing BP: 131/69 HR: 108  Site: --  Mode: --  Orthostatic VS  01-30-24 @ 06:13  Lying BP: --/-- HR: --  Sitting BP: 119/78 HR: 59  Standing BP: 123/77 HR: 90  Site: --  Mode: --    Lipid Panel: Date/Time: 01-24-24 @ 19:18  Cholesterol, Serum: 169  LDL Cholesterol Calculated: 93  HDL Cholesterol, Serum: 55  Total Cholesterol/HDL Ration Measurement: --  Triglycerides, Serum: 106

## 2024-01-31 NOTE — BH PSYCHOLOGY - GROUP THERAPY NOTE - NSPSYCHOLGRPBILLING_PSY_A_CORE
[id number] - Group Psychotherapy
[id number] - Group Psychotherapy
[id number] - Group Psychotherapy
[id number] - Group Psychotherapy
[id number] - Group Psychotherapy

## 2024-01-31 NOTE — BH PSYCHOLOGY - GROUP THERAPY NOTE - TOKEN PULL-DIAGNOSIS
Primary Diagnosis:  Major depressive episode [F32.9]        Problem Dx:   

## 2024-01-31 NOTE — BH INPATIENT PSYCHIATRY DISCHARGE NOTE - HPI (INCLUDE ILLNESS QUALITY, SEVERITY, DURATION, TIMING, CONTEXT, MODIFYING FACTORS, ASSOCIATED SIGNS AND SYMPTOMS)
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 ETOH use fluctuates from 2x/week to 5x/week; at most drank 10 drinks with blackouts; denies complicated withdrawals/DTs/seizures. Pt was engaged in care with [facility] 12/21/22-5/12/23. Pt was referred to [facility] following a suicidal gesture of consuming ETOH and Klonopin, his friends on campus checked on him and called campus security. Pt reports he was admitted to [facility] for about 1 week to 10 days, was COVID + at the time and received care in their medical ED vs psychiatric; currently sees psychiatrist Dr Gricelda Lazar (phone [phone]) (on campus) -  last seen 2 days ago and also sees a therapist. Today, Patient presented to the [facility] seeking assessment for possible admission. Pt reports 6 weeks ago experiencing changes with his medication.    Pt reported that he has been taking Prozac 20 mg since d/c with [facility], Abilify 2 mg was reduced to 1 mg 6 weeks ago. Wellbutrin was added 6 weeks ago and currently taking 300 mg. Pt additionally taking Xanax 0.5 mg PRN, notes taking a few times per month. Hydroxyzine uncertain of dosage and at times takes Propranolol left over from [facility], Dr Lazar not currently prescribing. Pt reports since taking Wellbutrin has been experiencing racing and intrusive thoughts and feeling out of control, with SI and suicidal behaviors. Pt reports yesterday morning was not experiencing SI, had therapy session around noon. Pt reports he began thinking about stressors in his life such as rejection from school he applied to, let go from job, self-identify, gender and sexuality confusion and strain in relationship with girlfriend. Pt reports following session began to have thoughts that yesterday was the day he was going to end his life. Went home from his girlfriends and obtained large amount of ETOH, wrote suicide letter and gathered pills. Pt went to his girlfriend’s art studio to say goodbye when she noticed he was distressed, took pt to his home and found ETOH, pills and letter. Girlfriend contacted pt’s parents who picked pt up from school. Pt reports this morning his parents left him alone for 5 minutes, during that time frame put his belt around his neck tightening it, pt notes he released belt shortly afterwards. Pt did not make parents aware of this morning’s attempt until meeting with writer at crisis center. Pt notes concern that his actions last night and today were impulsive. Pt reports SI did not dissipate until he was in the waiting room at crisis center waiting to be seen.    On Unit L3: calm, cooperative, on the phone with his mom getting a peptalk as Pt is visibly taken aback by the L3 milieu. Emotional support given, encouraged ot tell staff anytime he feels unsafe etc and that staff will try to move him in AM. He said ok.     COLLATERAL FROM PT'S PARENTS Harriett and Patrick Heather: [phone] and [phone] : Pt’s parents report he has been seeing therapist multiple times per week as of recent. Parents report they received call from pt’s girlfriend last night, after she discovered letter, pills and ETOH. Parents picked pt up from school arriving home before midnight, note pt slept until this morning. While meeting with parents, pt discussed this morning’s aborted suicide attempt as parents were not aware. Parents report pt appeared less distressed while at Crisis center though last night and this morning appeared to be in crisis. Parents report concern that pt is not sleeping fully through the night and periods of not drinking water or eating enough. Parents state they are agreeable with plan recommended by crisis center at this time.

## 2024-01-31 NOTE — BH PSYCHOLOGY - GROUP THERAPY NOTE - NSPSYCHOLGRPDBTPT_PSY_A_CORE
stable mood and affect in group/patient showing good behavior control/Patient able to identify mood states/other...

## 2024-01-31 NOTE — BH PSYCHOLOGY - GROUP THERAPY NOTE - NSBHPSYCHOLRESPONSE_PSY_A_CORE
Symptoms reduced/Coping skills acquired/Accepted support
Coping skills acquired/Insight displayed/Accepted support

## 2024-11-22 NOTE — PSYCHIATRIC REHAB INITIAL EVALUATION - NSPROGENSOURCEINFO_GEN_A_NUR
Pt was scheduled for 07/29/25 for a f/u appt.      Pt wants to know if she should be taking losartan.  Pt would like to speak to you direclty. Phone # [phone]   patient/health record

## 2025-01-30 ENCOUNTER — OUTPATIENT (OUTPATIENT)
Dept: OUTPATIENT SERVICES | Facility: HOSPITAL | Age: 25
LOS: 1 days | Discharge: ANOTHER HOSPITAL | End: 2025-01-30

## 2025-01-30 ENCOUNTER — INPATIENT (INPATIENT)
Facility: HOSPITAL | Age: 25
LOS: 6 days | Discharge: HOME | End: 2025-02-06
Attending: PSYCHIATRY & NEUROLOGY | Admitting: STUDENT IN AN ORGANIZED HEALTH CARE EDUCATION/TRAINING PROGRAM
Payer: COMMERCIAL

## 2025-01-30 VITALS
SYSTOLIC BLOOD PRESSURE: 171 MMHG | HEART RATE: 112 BPM | RESPIRATION RATE: 17 BRPM | OXYGEN SATURATION: 99 % | WEIGHT: 149.91 LBS | DIASTOLIC BLOOD PRESSURE: 93 MMHG | TEMPERATURE: 98 F

## 2025-01-30 DIAGNOSIS — F39 UNSPECIFIED MOOD [AFFECTIVE] DISORDER: ICD-10-CM

## 2025-01-30 LAB
ADD ON TEST-SPECIMEN IN LAB: SIGNIFICANT CHANGE UP
ADD ON TEST-SPECIMEN IN LAB: SIGNIFICANT CHANGE UP
ALBUMIN SERPL ELPH-MCNC: 5.2 G/DL — HIGH (ref 3.3–5)
ALP SERPL-CCNC: 69 U/L — SIGNIFICANT CHANGE UP (ref 40–120)
ALT FLD-CCNC: 14 U/L — SIGNIFICANT CHANGE UP (ref 4–41)
AMORPH URATE CRY # URNS: PRESENT
AMPHET UR-MCNC: NEGATIVE — SIGNIFICANT CHANGE UP
ANION GAP SERPL CALC-SCNC: 19 MMOL/L — HIGH (ref 7–14)
APAP SERPL-MCNC: <10 UG/ML — LOW (ref 15–25)
APPEARANCE UR: ABNORMAL
AST SERPL-CCNC: 19 U/L — SIGNIFICANT CHANGE UP (ref 4–40)
BACTERIA # UR AUTO: NEGATIVE /HPF — SIGNIFICANT CHANGE UP
BARBITURATES UR SCN-MCNC: NEGATIVE — SIGNIFICANT CHANGE UP
BASOPHILS # BLD AUTO: 0.08 K/UL — SIGNIFICANT CHANGE UP (ref 0–0.2)
BASOPHILS NFR BLD AUTO: 0.6 % — SIGNIFICANT CHANGE UP (ref 0–2)
BENZODIAZ UR-MCNC: POSITIVE
BILIRUB SERPL-MCNC: 0.7 MG/DL — SIGNIFICANT CHANGE UP (ref 0.2–1.2)
BILIRUB UR-MCNC: NEGATIVE — SIGNIFICANT CHANGE UP
BUN SERPL-MCNC: 16 MG/DL — SIGNIFICANT CHANGE UP (ref 7–23)
CALCIUM SERPL-MCNC: 10.5 MG/DL — SIGNIFICANT CHANGE UP (ref 8.4–10.5)
CAST: 1 /LPF — SIGNIFICANT CHANGE UP (ref 0–4)
CHLORIDE SERPL-SCNC: 104 MMOL/L — SIGNIFICANT CHANGE UP (ref 98–107)
CO2 SERPL-SCNC: 21 MMOL/L — LOW (ref 22–31)
COCAINE METAB.OTHER UR-MCNC: NEGATIVE — SIGNIFICANT CHANGE UP
COLOR SPEC: ABNORMAL
CREAT SERPL-MCNC: 0.91 MG/DL — SIGNIFICANT CHANGE UP (ref 0.5–1.3)
CREATININE URINE RESULT, DAU: 442 MG/DL — SIGNIFICANT CHANGE UP
DIFF PNL FLD: NEGATIVE — SIGNIFICANT CHANGE UP
EGFR: 122 ML/MIN/1.73M2 — SIGNIFICANT CHANGE UP
EOSINOPHIL # BLD AUTO: 0.04 K/UL — SIGNIFICANT CHANGE UP (ref 0–0.5)
EOSINOPHIL NFR BLD AUTO: 0.3 % — SIGNIFICANT CHANGE UP (ref 0–6)
ETHANOL SERPL-MCNC: <10 MG/DL — SIGNIFICANT CHANGE UP
FENTANYL UR QL SCN: NEGATIVE — SIGNIFICANT CHANGE UP
GLUCOSE SERPL-MCNC: 99 MG/DL — SIGNIFICANT CHANGE UP (ref 70–99)
GLUCOSE UR QL: NEGATIVE MG/DL — SIGNIFICANT CHANGE UP
HCT VFR BLD CALC: 42.7 % — SIGNIFICANT CHANGE UP (ref 39–50)
HGB BLD-MCNC: 14.9 G/DL — SIGNIFICANT CHANGE UP (ref 13–17)
IANC: 9.95 K/UL — HIGH (ref 1.8–7.4)
IMM GRANULOCYTES NFR BLD AUTO: 0.4 % — SIGNIFICANT CHANGE UP (ref 0–0.9)
KETONES UR-MCNC: 15 MG/DL
LEUKOCYTE ESTERASE UR-ACNC: NEGATIVE — SIGNIFICANT CHANGE UP
LYMPHOCYTES # BLD AUTO: 18.2 % — SIGNIFICANT CHANGE UP (ref 13–44)
LYMPHOCYTES # BLD AUTO: 2.55 K/UL — SIGNIFICANT CHANGE UP (ref 1–3.3)
MCHC RBC-ENTMCNC: 30.7 PG — SIGNIFICANT CHANGE UP (ref 27–34)
MCHC RBC-ENTMCNC: 34.9 G/DL — SIGNIFICANT CHANGE UP (ref 32–36)
MCV RBC AUTO: 88 FL — SIGNIFICANT CHANGE UP (ref 80–100)
METHADONE UR-MCNC: NEGATIVE — SIGNIFICANT CHANGE UP
MONOCYTES # BLD AUTO: 1.36 K/UL — HIGH (ref 0–0.9)
MONOCYTES NFR BLD AUTO: 9.7 % — SIGNIFICANT CHANGE UP (ref 2–14)
MUCOUS THREADS # UR AUTO: PRESENT
NEUTROPHILS # BLD AUTO: 9.95 K/UL — HIGH (ref 1.8–7.4)
NEUTROPHILS NFR BLD AUTO: 70.8 % — SIGNIFICANT CHANGE UP (ref 43–77)
NITRITE UR-MCNC: NEGATIVE — SIGNIFICANT CHANGE UP
NRBC # BLD AUTO: 0 K/UL — SIGNIFICANT CHANGE UP (ref 0–0)
NRBC # BLD: 0 /100 WBCS — SIGNIFICANT CHANGE UP (ref 0–0)
NRBC # FLD: 0 K/UL — SIGNIFICANT CHANGE UP (ref 0–0)
NRBC BLD-RTO: 0 /100 WBCS — SIGNIFICANT CHANGE UP (ref 0–0)
OPIATES UR-MCNC: NEGATIVE — SIGNIFICANT CHANGE UP
OXYCODONE UR-MCNC: NEGATIVE — SIGNIFICANT CHANGE UP
PCP SPEC-MCNC: SIGNIFICANT CHANGE UP
PCP UR-MCNC: NEGATIVE — SIGNIFICANT CHANGE UP
PH UR: 5.5 — SIGNIFICANT CHANGE UP (ref 5–8)
PLATELET # BLD AUTO: 397 K/UL — SIGNIFICANT CHANGE UP (ref 150–400)
POTASSIUM SERPL-MCNC: 3.3 MMOL/L — LOW (ref 3.5–5.3)
POTASSIUM SERPL-SCNC: 3.3 MMOL/L — LOW (ref 3.5–5.3)
PROT SERPL-MCNC: 8.2 G/DL — SIGNIFICANT CHANGE UP (ref 6–8.3)
PROT UR-MCNC: 30 MG/DL
RBC # BLD: 4.85 M/UL — SIGNIFICANT CHANGE UP (ref 4.2–5.8)
RBC # FLD: 12 % — SIGNIFICANT CHANGE UP (ref 10.3–14.5)
RBC CASTS # UR COMP ASSIST: 1 /HPF — SIGNIFICANT CHANGE UP (ref 0–4)
REVIEW: SIGNIFICANT CHANGE UP
SALICYLATES SERPL-MCNC: <0.3 MG/DL — LOW (ref 15–30)
SARS-COV-2 RNA SPEC QL NAA+PROBE: SIGNIFICANT CHANGE UP
SODIUM SERPL-SCNC: 144 MMOL/L — SIGNIFICANT CHANGE UP (ref 135–145)
SP GR SPEC: 1.03 — SIGNIFICANT CHANGE UP (ref 1–1.03)
SQUAMOUS # UR AUTO: 6 /HPF — HIGH (ref 0–5)
THC UR QL: POSITIVE
TOXICOLOGY SCREEN, DRUGS OF ABUSE, SERUM RESULT: SIGNIFICANT CHANGE UP
TSH SERPL-MCNC: 0.88 UIU/ML — SIGNIFICANT CHANGE UP (ref 0.27–4.2)
UROBILINOGEN FLD QL: 1 MG/DL — SIGNIFICANT CHANGE UP (ref 0.2–1)
WBC # BLD: 14.03 K/UL — HIGH (ref 3.8–10.5)
WBC # FLD AUTO: 14.03 K/UL — HIGH (ref 3.8–10.5)
WBC UR QL: 2 /HPF — SIGNIFICANT CHANGE UP (ref 0–5)

## 2025-01-30 PROCEDURE — 99285 EMERGENCY DEPT VISIT HI MDM: CPT

## 2025-01-30 RX ORDER — HALOPERIDOL 10 MG/1
5 TABLET ORAL ONCE
Refills: 0 | Status: DISCONTINUED | OUTPATIENT
Start: 2025-01-30 | End: 2025-01-31

## 2025-01-30 RX ORDER — ARIPIPRAZOLE 5 MG/1
2 TABLET ORAL DAILY
Refills: 0 | Status: COMPLETED | OUTPATIENT
Start: 2025-01-30 | End: 2025-01-30

## 2025-01-30 RX ORDER — HALOPERIDOL 10 MG/1
5 TABLET ORAL EVERY 6 HOURS
Refills: 0 | Status: DISCONTINUED | OUTPATIENT
Start: 2025-01-30 | End: 2025-01-31

## 2025-01-30 RX ORDER — ARIPIPRAZOLE 5 MG/1
2 TABLET ORAL AT BEDTIME
Refills: 0 | Status: DISCONTINUED | OUTPATIENT
Start: 2025-01-30 | End: 2025-01-31

## 2025-01-30 RX ADMIN — Medication 2 MILLIGRAM(S): at 23:02

## 2025-01-30 RX ADMIN — ARIPIPRAZOLE 2 MILLIGRAM(S): 5 TABLET ORAL at 21:09

## 2025-01-30 RX ADMIN — HALOPERIDOL 5 MILLIGRAM(S): 10 TABLET ORAL at 23:02

## 2025-01-30 NOTE — ED BEHAVIORAL HEALTH NOTE - BEHAVIORAL HEALTH NOTE
Search Terms: larry crane, 2000Search Date: 01/30/2025 17:18:30 PM  Searching on behalf of: Myself  The Drug Utilization Report below displays all of the controlled substance prescriptions, if any, that your patient has filled in the last twelve months. The information displayed on this report is compiled from pharmacy submissions to the Department, and accurately reflects the information as submitted by the pharmacies.    This report was requested by: Edith Wood | Reference #: [id number]    There are no results for the search terms that you entered.    ***      First Name  larry  ANDLast Name  royce  HERIBERTO  2000  Recipient might not exist in the [employer] application, either because they are new to Medicaid or new to the Behavioral Health population.  Maximum Number of Rows Displayed: 50

## 2025-01-30 NOTE — ED ADULT NURSE NOTE - EXTENSIONS OF SELF_ADULT
Per PDMP:        Last Office Visit: 08/22/2022    Next Office Visit:none    Medication Management     - Recommended increased Gabapentin-titration to 1800mg. Will start by increasing to 300mg QID  - Educated patient on proper use of medication. Side effects of medication discussed with patient.   - PDMP reviewed and appropriate.     Medication set up and pending provider approval     None

## 2025-01-30 NOTE — ED PROVIDER NOTE - PROGRESS NOTE DETAILS
ALINE Rivera- with amorphous urates which can be multiple reasons like dehydration, vs stones, upon assessment denies urinary symptoms back pain, during stay encouraged po fluid   pt medically cleared and will go to [facility].

## 2025-01-30 NOTE — ED BEHAVIORAL HEALTH ASSESSMENT NOTE - OTHER
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

## 2025-01-30 NOTE — ED ADULT NURSE NOTE - OBJECTIVE STATEMENT
BREAK RN; pt. received to  from walk in triage brought in by parents  presenting for a psych eval from the [facility] Crisis center. pt. endorses minimal information, As per  Collateral information, pt. went through a break up, was started on prozac and titrated on it, and was "doing better". x1 day ago, pt. became selectively mute, and was pacing around the house. pt. does not maintain eye contact upon interview. pt. calm, cooperative and rediretable. Denies S/I and H/I, A/H and V/H, ETOH. As per  Collateral, pt. uses marijuana. Pt. belongings secured. pt. wanded for safety. pt. independently changed into  Clothing. eval on going at this time.

## 2025-01-30 NOTE — ED ADULT TRIAGE NOTE - CHIEF COMPLAINT QUOTE
pt sent over from [facility] crisis center to r/o psychosis. pt with BPD stopped speaking 12 hrs ago. pt appears internally preoccupied.

## 2025-01-30 NOTE — ED BEHAVIORAL HEALTH ASSESSMENT NOTE - SUMMARY
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, domiciled with parents, no past medical history, with past charted psychiatric history of Borderline Personality Disorder, anxiety and panic attacks, MDD, past charted hx of "experimenting with agents (last semester took Adderall for about 1 week obtained from someone else)" past charted hx of "Cannabis use ~ 1x/week; ETOH use fluctuates from 2x/week to 5x/week; at most drank 10 drinks with blackouts; denies complicated withdrawals/DTs/seizures," previously in treatment at [facility] 12/21/22-5/12/23 s/p being referred there s/p following a suicidal gesture of consuming ETOH and Klonopin, with past inpatient psychiatric admissions on 1S (9.13 direct admit from [facility] in 2024), does not currently see a psychiatrist but attends DBT therapy sessions with Jorje Swann ([phone]), his PCP prescribed Prozac 20mg, later reduced to 10mg, who was brought in by family from [facility] for change in behavior over the lats several days and then being non verbal starting earlier in the day.      On evaluation, patient presents with depressed, anxious, constricted affect, poor eye contact/ relatedness, soft volume, slow rate, decreased productivity, increased latency, minimally responsive, w/ poverty of thought, and poor sleep, with sx in context of possible ?hypomanic symptoms earlier in the week (as indicated by collateral note), with inability to engage in meaningful evaluation / safety planning due to sx severity..  At this time, etiology of symptoms are unclear, however, patient sx severity appears to put him at risk for inadvertent imminent harm to self and impairment in functioning. Ddx includes acute severe depression, bipolar / unspecified mood disorder, severe anxiety / stress reaction, burgeoning negative sx/ catatonia, or mood d/o with psychotic features , vs substance induced given +THC. For now, will restart patient on Abilify for its mood stabilizing / antidepressant and antipsychotic properties with good response to same on past admission until further diagnostic clarity w/ more longitudinal follow up on inpatient unit.      Recommendations:   -admit on 9.39 legal status, pending medical clearance, f/u CPK and EKG  -start Abilify 2mg PO qhs   -Hydroxyzine 50mg PO PRN q8h for anxiety   -psychoeducation and MI regarding destabilizing effects of substances on mental health  -For agitation please attempt verbal de-escalation and behavioral intervention first. If patient does not respond to above, may give haldol 2 mg po q6h prn, ativan 1 mg po q6h prn if no contraindications. If patient is refusing PO, remains an imminent danger to self or others and If Patient's  qtc <500, can escalate to IM formulation. If IM antipsychotic is administered, please perform follow-up ECG for QTc monitoring. 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 [place] [facility], domiciled with parents, no past medical history, with past charted psychiatric history of Borderline Personality Disorder, anxiety and panic attacks, MDD, past charted hx of "experimenting with agents (last semester took Adderall for about 1 week obtained from someone else)" past charted hx of "Cannabis use ~ 1x/week; ETOH use fluctuates from 2x/week to 5x/week; at most drank 10 drinks with blackouts; denies complicated withdrawals/DTs/seizures," previously in treatment at [facility] 12/21/22-5/12/23 s/p being referred there s/p following a suicidal gesture of consuming ETOH and Klonopin, with past inpatient psychiatric admissions on 1S (9.13 direct admit from [facility] in 2024), does not currently see a psychiatrist but attends DBT therapy sessions with Jorje Swann ([phone]), his PCP prescribed Prozac 20mg, later reduced to 10mg, who was brought in by family from [facility] for change in behavior over the lats several days and then being non verbal starting earlier in the day.      On evaluation, patient presents with depressed, anxious, constricted affect, poor eye contact/ relatedness, soft volume, slow rate, decreased productivity, increased latency, minimally responsive, w/ poverty of thought, and poor sleep, with sx in context of possible ?hypomanic symptoms earlier in the week (as indicated by collateral note), with inability to engage in meaningful evaluation / safety planning due to sx severity..  At this time, etiology of symptoms are unclear, however, patient sx severity appears to put him at risk for inadvertent imminent harm to self and impairment in functioning. Ddx includes acute severe depression, bipolar / unspecified mood disorder, severe anxiety / stress reaction, burgeoning negative sx/ catatonia, or mood d/o with psychotic features , vs substance induced given +THC. For now, will restart patient on Abilify for its mood stabilizing / antidepressant and antipsychotic properties with good response to same on past admission until further diagnostic clarity w/ more longitudinal follow up on inpatient unit.      Recommendations:   -admit on 9.39 legal status, pending medical clearance, f/u CPK and EKG  -start Abilify 2mg PO qhs   -Hydroxyzine 50mg PO PRN q8h for anxiety   -psychoeducation and MI regarding destabilizing effects of substances on mental health  -For agitation please attempt verbal de-escalation and behavioral intervention first. If patient does not respond to above, may give haldol 5 mg po q6h prn, ativan 2 mg po q6h prn if no contraindications. If patient is refusing PO, remains an imminent danger to self or others and If Patient's  qtc <500, can escalate to IM formulation. If IM antipsychotic is administered, please perform follow-up ECG for QTc monitoring.

## 2025-01-30 NOTE — ED BEHAVIORAL HEALTH ASSESSMENT NOTE - NSBHSACONSEQUENCE_PSY_A_CORE FT
patient denies and per chart review no hx of withdrawal sx or seizures or complicated withdrawal sx.

## 2025-01-30 NOTE — ED BEHAVIORAL HEALTH ASSESSMENT NOTE - DESCRIPTION
see HPI denies During course of ED visit patient was calm and cooperative. Patient was not aggressive or violent and did not require PRN medications.      ICU Vital Signs Last 24 Hrs  T(C): 36.6 (30 Jan 2025 16:04), Max: 36.6 (30 Jan 2025 16:04)  T(F): 97.9 (30 Jan 2025 16:04), Max: 97.9 (30 Jan 2025 16:04)  HR: 112 (30 Jan 2025 16:04) (112 - 112)  BP: 171/93 (30 Jan 2025 16:04) (171/93 - 171/93)  BP(mean): --  ABP: --  ABP(mean): --  RR: 17 (30 Jan 2025 16:04) (17 - 17)  SpO2: 99% (30 Jan 2025 16:04) (99% - 99%)    O2 Parameters below as of 30 Jan 2025 16:04  Patient On (Oxygen Delivery Method): room air

## 2025-01-30 NOTE — ED BEHAVIORAL HEALTH ASSESSMENT NOTE - DETAILS
see HPI : sister, mother, and father with hx of depression and anxiety. Notes family members are prescribed Xanax and sister prescribed SSRI. Father’s brother  from accidental OD, with hx of substance abuse. Denies family hx of suicide given to BANDAR family and [facility] CC staff made aware of plan per chart review: "sister, mother, and father with hx of depression and anxiety. Notes family members are prescribed Xanax and sister prescribed SSRI. Father’s brother  from accidental OD, with hx of substance abuse. Denies family hx of suicide" per parents patient with somewhat similar presentation s/p Wellbutrin

## 2025-01-30 NOTE — BH PATIENT PROFILE - HOME MEDICATIONS
hydrOXYzine hydrochloride 25 mg oral tablet , 1 tab(s) orally once a day as needed for  anxiety  ARIPiprazole 2 mg oral tablet , 1 tab(s) orally once a day  FLUoxetine 40 mg oral capsule , 1 cap(s) orally once a day  Xanax 0.5 mg oral tablet , 1 tab(s) orally once a day as needed for  anxiety

## 2025-01-30 NOTE — ED ADULT NURSE NOTE - CHIEF COMPLAINT QUOTE
pt sent over from [facility] crisis center to r/o psychosis. pt with BPD stopped speaking 12 hrs ago. pt appears internally preoccupied.

## 2025-01-30 NOTE — ED BEHAVIORAL HEALTH ASSESSMENT NOTE - NSBHMSEBEHAV_PSY_A_CORE
Preventive Health Recommendations  Male Ages 26 - 39    Yearly exam:             See your health care provider every year in order to  o   Review health changes.   o   Discuss preventive care.    o   Review your medicines if your doctor has prescribed any.    You should be tested each year for STDs (sexually transmitted diseases), if you re at risk.     After age 35, talk to your provider about cholesterol testing. If you are at risk for heart disease, have your cholesterol tested at least every 5 years.     If you are at risk for diabetes, you should have a diabetes test (fasting glucose).  Shots: Get a flu shot each year. Get a tetanus shot every 10 years.     Nutrition:    Eat at least 5 servings of fruits and vegetables daily.     Eat whole-grain bread, whole-wheat pasta and brown rice instead of white grains and rice.     Get adequate Calcium and Vitamin D.     Lifestyle    Exercise for at least 150 minutes a week (30 minutes a day, 5 days a week). This will help you control your weight and prevent disease.     Limit alcohol to one drink per day.     No smoking.     Wear sunscreen to prevent skin cancer.     See your dentist every six months for an exam and cleaning.     
Cooperative

## 2025-01-30 NOTE — ED BEHAVIORAL HEALTH ASSESSMENT NOTE - NSBHMSEPERCEPT_PSY_A_CORE
unable to assess due to seeming poverty of thought content, however, does not appear internally preoccupied / responding to internal stimuli/Unable to assess

## 2025-01-30 NOTE — BH PATIENT PROFILE - FALL HARM RISK - UNIVERSAL INTERVENTIONS
Bed in lowest position, wheels locked, appropriate side rails in place/Call bell, personal items and telephone in reach/Instruct patient to call for assistance before getting out of bed or chair/Non-slip footwear when patient is out of bed/[place] to call system/Physically safe environment - no spills, clutter or unnecessary equipment/Purposeful Proactive Rounding/Room/bathroom lighting operational, light cord in reach

## 2025-01-30 NOTE — ED BEHAVIORAL HEALTH ASSESSMENT NOTE - CURRENT MEDICATION
MEDICATIONS  (STANDING):  ARIPiprazole 1 milliGRAM(s) Oral daily  buPROPion XL (24-Hour) 300 milliGRAM(s) Oral daily  FLUoxetine 20 milliGRAM(s) Oral daily  melatonin. 6 milliGRAM(s) Oral at bedtime    MEDICATIONS  (PRN):  acetaminophen     Tablet .. 650 milliGRAM(s) Oral every 6 hours PRN Temp greater or equal to 38C (100.4F), Mild Pain (1 - 3)  ALPRAZolam 0.5 milliGRAM(s) Oral daily PRN for anxiety  aluminum hydroxide/magnesium hydroxide/simethicone Suspension 30 milliLiter(s) Oral every 6 hours PRN Dyspepsia  diphenhydrAMINE Injectable 50 milliGRAM(s) IntraMuscular once PRN agitation  hydrOXYzine hydrochloride 25 milliGRAM(s) Oral every 6 hours PRN Restlessness  LORazepam   Injectable 2 milliGRAM(s) IntraMuscular once PRN agitation  magnesium hydroxide Suspension 30 milliLiter(s) Oral daily PRN Constipation   Prozac 10mg PO qdaily

## 2025-01-30 NOTE — ED BEHAVIORAL HEALTH NOTE - BEHAVIORAL HEALTH NOTE
At the provider's request, the writer contacted the patient's parents, Patrick ([phone]) and Mary Jane ([phone]). 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 He was hospitalized approximately one year ago on 1s , after which his condition improved. He does not currently see a psychiatrist but attends DBT therapy sessions with Jorje Swann ([phone]). He had a positive, productive session yesterday, focusing on a vision board.     A couple weeks ago,  pt had a recent breakup, he experienced a period of low mood, and his PCP prescribed Prozac 20mg, later reduced to 10mg. While he has been taking 10mg, he did not take it today. He took 20mg for one week, then it was reduced 10mg. Prior to this patient has not taken any medication prior since July. He has responded well to Prozac in the past. Within the last 24 hours, he became increasingly agitated. His mother administered her  Xanax 0.25mg yesterday morning and again this morning. He remained agitated, and another dose of Xanax was offered around noon, but it's unclear if he took it. He also took half of a 25mg hydroxyzine tablet last night.    On Sunday, he was quiet during a family gathering. Monday brought increased stress and feelings of guilt regarding his past treatment of others. Yesterday morning, he woke his parents at 5:00 AM in a seemingly positive mood, declaring a breakthrough, having created a vision board with plans and objectives. He subsequently had lunch with a friend, but his mood deteriorated; he did not sleep well, staying up late "burning the midnight oil," and reported feeling stressed. He declined his father's suggestion of a bath, but stated that he needed to keep moving. By 10:00 PM, he became non-verbal, pacing up and down the stairs for approximately two hours and exhibiting restlessness throughout the night. He has not spoken since 10:00 PM last night. Pt is not using any drug and alcohol (marijuana) since October 2024. He has not been physically violent but reports self-hatred and negative thinking. He denies suicidal ideation, auditory/visual hallucinations, and current medical issues or allergies. There are no firearms in the home. He occasionally expresses feelings of derealization when symptomatic. In December 2022, while under psychiatric care, he overdosed on an unspecified bottle of pills. He is currently not attending college but is enrolled in the acting program. At baseline, patient is jolly and personable.   Parents both have anxiety. Parents are advocating for 1s. At the provider's request, the writer contacted the patient's parents, Patrick ([phone]) and Mary Jane ([phone]). 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 He was hospitalized approximately one year ago on 1s , after which his condition improved. He does not currently see a psychiatrist but attends DBT therapy sessions with Jorje Swann ([phone]). He had a positive, productive session yesterday, focusing on a vision board.     A couple weeks ago,  pt had a recent breakup, he experienced a period of low mood, and his PCP prescribed Prozac 20mg, later reduced to 10mg. While he has been taking 10mg, he did not take it today. He took 20mg for one week, then it was reduced 10mg. Prior to this patient has not taken any medication prior since July. He has responded well to Prozac in the past. Within the last 24 hours, he became increasingly agitated. His mother administered her  Xanax 0.25mg yesterday morning and again this morning. He remained agitated, and another dose of Xanax was offered around noon, but it's unclear if he took it. He also took half of a 25mg hydroxyzine tablet last night.    On Sunday, he was quiet during a family gathering. Monday brought increased stress and feelings of guilt regarding his past treatment of others. Yesterday morning, he woke his parents at 5:00 AM in a seemingly positive mood, declaring a breakthrough, having created a vision board with plans and objectives. He subsequently had lunch with a friend, but his mood deteriorated; he did not sleep well, staying up late "burning the midnight oil," and reported feeling stressed. He declined his father's suggestion of a bath, but stated that he needed to keep moving. By 10:00 PM, he became non-verbal, pacing up and down the stairs for approximately two hours and exhibiting restlessness throughout the night. He has not spoken since 10:00 PM last night. Pt is not using any drug and alcohol (marijuana) since October 2024. He has not been physically violent but reports self-hatred and negative thinking. He denies suicidal ideation, auditory/visual hallucinations, and current medical issues or allergies. There are no firearms in the home. He occasionally expresses feelings of derealization when symptomatic. In December 2022, while under psychiatric care, he overdosed on an unspecified bottle of pills. He is currently not attending college but is enrolled in the acting program. At baseline, patient is jolly and personable.   Parents both have anxiety. Parents are advocating for 1s.      writer informed parents of patient admission to  at [facility].

## 2025-01-30 NOTE — ED BEHAVIORAL HEALTH ASSESSMENT NOTE - VIOLENCE PROTECTIVE FACTORS:
Residential stability/Relationship stability/Engagement in treatment/Insight into violence risk and need for management/treatment/Good treatment response/compliance Residential stability/Sobriety/Engagement in treatment

## 2025-01-30 NOTE — ED BEHAVIORAL HEALTH ASSESSMENT NOTE - HPI (INCLUDE ILLNESS QUALITY, SEVERITY, DURATION, TIMING, CONTEXT, MODIFYING FACTORS, ASSOCIATED SIGNS AND SYMPTOMS)
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 ETOH use fluctuates from 2x/week to 5x/week; at most drank 10 drinks with blackouts; denies complicated withdrawals/DTs/seizures. Pt was engaged in care with [facility] 12/21/22-5/12/23. Pt was referred to [facility] following a suicidal gesture of consuming ETOH and Klonopin, his friends on campus checked on him and called campus security. Pt reports he was admitted to [facility] for about 1 week to 10 days, was COVID + at the time and received care in their medical ED vs psychiatric; currently sees psychiatrist Dr Gricelda Lazar (phone [phone]) (on campus) -  last seen 2 days ago and also sees a therapist. Today, Patient presented to the [facility] seeking assessment for possible admission. Pt reports 6 weeks ago experiencing changes with his medication.    Pt reported that he has been taking Prozac 20 mg since d/c with [facility], Abilify 2 mg was reduced to 1 mg 6 weeks ago. Wellbutrin was added 6 weeks ago and currently taking 300 mg. Pt additionally taking Xanax 0.5 mg PRN, notes taking a few times per month. Hydroxyzine uncertain of dosage and at times takes Propranolol left over from [facility], Dr Lazar not currently prescribing. Pt reports since taking Wellbutrin has been experiencing racing and intrusive thoughts and feeling out of control, with SI and suicidal behaviors. Pt reports yesterday morning was not experiencing SI, had therapy session around noon. Pt reports he began thinking about stressors in his life such as rejection from school he applied to, let go from job, self-identify, gender and sexuality confusion and strain in relationship with girlfriend. Pt reports following session began to have thoughts that yesterday was the day he was going to end his life. Went home from his girlfriends and obtained large amount of ETOH, wrote suicide letter and gathered pills. Pt went to his girlfriend’s art studio to say goodbye when she noticed he was distressed, took pt to his home and found ETOH, pills and letter. Girlfriend contacted pt’s parents who picked pt up from school. Pt reports this morning his parents left him alone for 5 minutes, during that time frame put his belt around his neck tightening it, pt notes he released belt shortly afterwards. Pt did not make parents aware of this morning’s attempt until meeting with writer at crisis center. Pt notes concern that his actions last night and today were impulsive. Pt reports SI did not dissipate until he was in the waiting room at crisis center waiting to be seen.    On Unit L3: calm, cooperative, on the phone with his mom getting a peptalk as Pt is visibly taken aback by the L3 milieu. Emotional support given, encouraged ot tell staff anytime he feels unsafe etc and that staff will try to move him in AM. He said ok.     COLLATERAL FROM PT'S PARENTS Harriett and Patrick Heather: [phone] and [phone] : Pt’s parents report he has been seeing therapist multiple times per week as of recent. Parents report they received call from pt’s girlfriend last night, after she discovered letter, pills and ETOH. Parents picked pt up from school arriving home before midnight, note pt slept until this morning. While meeting with parents, pt discussed this morning’s aborted suicide attempt as parents were not aware. Parents report pt appeared less distressed while at Crisis center though last night and this morning appeared to be in crisis. Parents report concern that pt is not sleeping fully through the night and periods of not drinking water or eating enough. Parents state they are agreeable with plan recommended by crisis center at this time.     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 [facility], domiciled with parents, no past medical history, with past charted psychiatric history of Borderline Personality Disorder, anxiety and panic attacks, MDD, past charted hx of "experimenting with agents (last semester took Adderall for about 1 week obtained from someone else)" past charted hx of "Cannabis use ~ 1x/week; ETOH use fluctuates from 2x/week to 5x/week; at most drank 10 drinks with blackouts; denies complicated withdrawals/DTs/seizures," previously in treatment at [facility] 12/21/22-5/12/23 s/p being referred there s/p following a suicidal gesture of consuming ETOH and Klonopin, with past inpatient psychiatric admissions on 1S (9.13 direct admit from [facility] in 2024), does not currently see a psychiatrist but attends DBT therapy sessions with Jorje Swann ([phone]), his PCP prescribed Prozac 20mg, later reduced to 10mg, who was brought in by family from [facility] for change in behavior over the lats several days and then being non verbal starting earlier in the day.      On evaluation, patient is alert, calm, cooperative, however, oddly related, with significantly speech latency, slow and soft speech, poverty of thought, poor eye contact and largely repeating self throughout interview.  When asked what brings patient to the emergency room, he states "my parents brought me here."  Writer asks why he thinks parents worried about him and he states "I was just like...." and does not finish sentence.  Writer prompts patient again, and he states "I was just picking at my skin."  Writer asks follow up open ended questions and patient states "yeah..." and just nodding head indiscriminately.  Patient continues to stare at the floor, looking away, and shaking his head.  Writer asks why he is shaking his head, and he states "um...yeah" "I was uh..."  Patient then states "I was going through a tough time."  Writer asks patient to clarify and patient states "I was like....yeah." Writer asks patient if he is feeling anxious as he appears to be and he states "I haven't really been myself lately."  Writer asks for clarification again and he states "im in shock right now." and clarified "I...uh spent like a while just not really taking care of myself." and clarified that he has not been sleeping but unable to answer any details about recent sleep patterns.  Reports mood has been "ok."  Denies depressed, sad or tearful mood, denies euphoria or irritability.  Denies SI but not able to take partake towards meaningful safety planning given mental status exam.  Patient denies alcohol, cigarette, drug use or taking medication not as Rxed or overdose on any pills / substances.   Denies AVH.  Denies HI. Interview is terminated as patient not able to engage in further questions and answers with latency and not finishing his sentences.     Brian Keenan: Patient speaks less than 20 words per 5 min (+2),and with some starting (+1), No other positive findings on scale.     Collateral obtained by [facility].  Reviewed.  Collateral from [facility] as below.     "A patient from [facility] is on his way to the ED for evaluation, Jimbo Guillermotisha [birth year], i am writing handoff now. possible manic episode with psychosis - patient is non-verbal with high BP & HR needing medical clearance   Patient is a 24 year old  male domiciled in private residence in [place], [place] with mother and father. Patient presents as non-verbal and internally preoccupied. Patient looking out the window and rocking back and forth in chair. Patient unable to make eye contact with others. Patient has a hx of two inpatient hospitalizations (2022 & January 2024) due to depressive symptoms. Most recent hospitalization due to suicide note left for family with plan.  Patient currently in treatment at [facility]. Patient was discharged in 2024 on Prozac 40mg and 1mg abilify. Parents report that patient stopped medication in August of 2024 due to feeling better, more positive, and continued with therapy. Parents report that patient broke up with his girlfriend in early january 2025. He began experiencing negative thoughts about self/situation. Patient re-started Prozac 20mg with PCP 2.5 weeks ago for approximately 1 week and reported "not feeling right" so PCP decreased to 10mg. Patient has been on 10mg. Parents report that on Wednesday morning at 5am, patient woke them up in their bedroom and appeared hyperverbal and happy to discuss his "plan for the future" with a completed vision board. He reported feeling "really great." Parents state that, at approximately 3pm that day, the patient began saying things like, "I have to keep moving" and would walk up and down the stairs for hours on end. He reported telling parents that took a bath but was wearing the same clothes that he wore prior to bath.   This morning, patient presented as catatonic and was just "standing there."    He has not spoken or ate today. SWer unable to complete assessment due to patient's presentation at this time. Mother and father do have healthcare proxy rights as well as HIPAA forms & they have paperwork with them.  Thank you, Please let me know if you have any other questions.   ZavalaIvone gilessca Patient is a 24 year old  male domiciled in private residence in [place], [place] with mother and father. Patient presents as non-verbal and internally preoccupied. Patient looking out the window and rocking back and forth in chair. Patient unable to make eye contact with others. Patient has a … Vital Signs in Crisis Center:  BP:172/101 P:115 WT:146 HT:5'11" TEMP: 98.3     mother gave him a 1mg xanax this morning as she thought he was "too anxious to speak" but that did not do anything to assist pt's presentation.  "

## 2025-01-30 NOTE — ED BEHAVIORAL HEALTH ASSESSMENT NOTE - NSACTIVEVENT_PSY_ALL_CORE
Large Joint Aspiration/Injection: L knee    Date/Time: 6/27/2023 9:30 AM  Performed by: Mara Akbar PA-C  Authorized by: Mara Akbar PA-C     Consent Done?:  Yes (Verbal)  Indications:  Pain  Site marked: the procedure site was marked    Timeout: prior to procedure the correct patient, procedure, and site was verified    Prep: patient was prepped and draped in usual sterile fashion      Local anesthesia used?: Yes    Anesthesia:  Local infiltration  Local anesthetic:  Lidocaine 1% without epinephrine    Details:  Needle Size:  22 G  Ultrasonic Guidance for needle placement?: No    Approach:  Anterolateral  Location:  Knee  Site:  L knee  Medications:  40 mg triamcinolone acetonide 40 mg/mL  Aspirate amount (mL):  25  Aspirate:  Yellow and blood-tinged  Patient tolerance:  Patient tolerated the procedure well with no immediate complications    Procedure Note:  We discussed the risk and benefits of injections, including pain, infection, bleeding, damage to adjacent structures, risk of reaction to injection. We discussed the steroid/cortisone injections will not heal the problem but mat help decrease inflammation and help with symptoms. We discussed the risk of repeated injections. The patient expressed understanding and wanted to proceed with the injection. We performed a timeout to verify the proper patient, proper procedure, and the proper site. The injection site was prepared in a sterile fashion. The patient tolerated it well and there were no complication. We did discuss with the patient that steroid injections can cause some increase in blood sugar and blood pressure for up to a week after the injection.     
Triggering events leading to humiliation, shame, and/or despair (e.g., Loss of relationship, financial or health status) (real or anticipated)

## 2025-01-30 NOTE — ED PROVIDER NOTE - CLINICAL SUMMARY MEDICAL DECISION MAKING FREE TEXT BOX
A 24-year-old male patient with a past medical history of depression presents with severe depression and selective mutism. He arrived with his parents after an appointment at [facility] . Upon arrival, he appeared guarded, exhibited thought blocking, and was hesitant to speak. He reported difficulty sleeping and feeling unwell, but was unable to elaborate further.  Psychiatric consult with clearance labs

## 2025-01-30 NOTE — ED BEHAVIORAL HEALTH ASSESSMENT NOTE - RISK ASSESSMENT
Acute risk to self given mood episode, insomnia, poor appetite, missed class with difficulty getting out of bed, struggling to do ADLs, low energy, no motivation and anhedonia. Pt reports recurrent intrusive thoughts that his friends don’t like him, he’s a burden, he’s better off dead, he’s a loser and has no future and girlfriend is cheating on him; SI/attempts and preparations were impulsive and unsure if he can remain safe in the community. He had a self-aborted attempt/gesture this morning where he placed a belt around his neck  Patient has chronic elevated risk for self harm given   Static Risk Factors: gender, past suicidal ideation / attempts, prior psychiatric hospitalizations, h/o psychiatric diagnosis, h/o substance use  Chronic risk factors are acutely elevated at this time due to active psychiatric sx, poor sleep patterns, unemployment, acute life stressor , not connected to outpatient care.

## 2025-01-30 NOTE — ED PROVIDER NOTE - OBJECTIVE STATEMENT
This is a yr M, [facility] A 24-year-old male patient with a past medical history of depression presents with severe depression and selective mutism. He arrived with his parents after an appointment at [facility] . Upon arrival, he appeared guarded, exhibited thought blocking, and was hesitant to speak. He reported difficulty sleeping and feeling unwell, but was unable to elaborate further.

## 2025-01-30 NOTE — ED BEHAVIORAL HEALTH ASSESSMENT NOTE - NSBHATTESTBILLING_PSY_A_CORE
99222-Initial OBS or IP - moderate complexity OR 55-74 mins 99285-Emergency department visit - high complexity

## 2025-01-30 NOTE — ED BEHAVIORAL HEALTH ASSESSMENT NOTE - NSSUICPROTFACT_PSY_ALL_CORE
Responsibility to children, family, or others/Identifies reasons for living/Supportive social network of family or friends/Fear of death or the actual act of killing self/Cultural, spiritual and/or moral attitudes against suicide/Engaged in work or school/Positive therapeutic relationships Supportive social network of family or friends/Engaged in work or school

## 2025-01-30 NOTE — ED PROVIDER NOTE - OTHER DETAILS FREE TEXT FOR MDM ADDL HISTORY OBTAINED FROM QUESTION
hand off reprot by [facility] via teams " Patient is a 24 year old  male domiciled in private residence in [place], [place] with mother and father. Patient presents as non-verbal and internally preoccupied. Patient looking out the window and rocking back and forth in chair. Patient unable to make eye contact with others. Patient has a hx of two inpatient hospitalizations (2022 & January 2024) due to depressive symptoms. Most recent hospitalization due to suicide note left for family with plan.  Patient currently in treatment at [facility]. Patient was discharged in 2024 on Prozac 40mg and 1mg abilify. Parents report that patient stopped medication in August of 2024 due to feeling better, more positive, and continued with therapy. Parents report that patient broke up with his girlfriend in early january 2025. He began experiencing negative thoughts about self/situation. Patient re-started Prozac 20mg with PCP 2.5 weeks ago for approximately 1 week and reported "not feeling right" so PCP decreased to 10mg. Patient has been on 10mg. Parents report that on Wednesday morning at 5am, patient woke them up in their bedroom and appeared hyperverbal and happy to discuss his "plan for the future" with a completed vision board. He reported feeling "really great." Parents state that, at approximately 3pm that day, the patient began saying things like, "I have to keep moving" and would walk up and down the stairs for hours on end. He reported telling parents that took a bath but was wearing the same clothes that he wore prior to bath.   This morning, patient presented as catatonic and was just "standing there."

## 2025-01-30 NOTE — ED BEHAVIORAL HEALTH ASSESSMENT NOTE - PAST PSYCHOTROPIC MEDICATION
Wellbutrin (unknown dose);   from most recent [facility] d/c: ARIPiprazole 2 mg oral tablet: 1 tab(s) orally once a day; FLUoxetine 40 mg oral capsule: 1 cap(s) orally once a day; hydrOXYzine hydrochloride 25 mg oral tablet: 1 tab(s) orally once a day as needed for  anxiety; Xanax 0.5 mg oral tablet: 1 tab(s) orally once a day as needed for  anxiety

## 2025-01-30 NOTE — ED BEHAVIORAL HEALTH ASSESSMENT NOTE - ADDITIONAL DETAILS ALL
see HPI  preparatory acts as per chart review; interrupted attempt as per chart review --  Pt was referred to [facility] following a suicidal gesture of consuming ETOH and Klonopin, his friends on campus checked on him and called campus security.  unable to assess past history beyond collateral and chart review given patient's mental status.

## 2025-01-31 DIAGNOSIS — F32.9 MAJOR DEPRESSIVE DISORDER, SINGLE EPISODE, UNSPECIFIED: ICD-10-CM

## 2025-01-31 DIAGNOSIS — F39 UNSPECIFIED MOOD [AFFECTIVE] DISORDER: ICD-10-CM

## 2025-01-31 DIAGNOSIS — F10.21 ALCOHOL DEPENDENCE, IN REMISSION: ICD-10-CM

## 2025-01-31 DIAGNOSIS — F60.3 BORDERLINE PERSONALITY DISORDER: ICD-10-CM

## 2025-01-31 DIAGNOSIS — F43.10 POST-TRAUMATIC STRESS DISORDER, UNSPECIFIED: ICD-10-CM

## 2025-01-31 PROCEDURE — 99222 1ST HOSP IP/OBS MODERATE 55: CPT | Mod: GC

## 2025-01-31 RX ORDER — QUETIAPINE FUMARATE 300 MG/1
50 TABLET ORAL AT BEDTIME
Refills: 0 | Status: DISCONTINUED | OUTPATIENT
Start: 2025-01-31 | End: 2025-02-04

## 2025-01-31 RX ORDER — FLUOXETINE HCL 10 MG
20 CAPSULE ORAL DAILY
Refills: 0 | Status: DISCONTINUED | OUTPATIENT
Start: 2025-01-31 | End: 2025-02-06

## 2025-01-31 RX ADMIN — QUETIAPINE FUMARATE 50 MILLIGRAM(S): 300 TABLET ORAL at 22:19

## 2025-01-31 RX ADMIN — Medication 50 MILLIGRAM(S): at 09:58

## 2025-01-31 NOTE — BH SOCIAL WORK INITIAL PSYCHOSOCIAL EVALUATION - OTHER PAST PSYCHIATRIC HISTORY (INCLUDE DETAILS REGARDING ONSET, COURSE OF ILLNESS, INPATIENT/OUTPATIENT TREATMENT)
Pt is a 23 yo male with 2 previous [facility] hospitalization in 2022 and 2024. Pt attended [facility] in the past. Pt has been seeing therapist at [facility], has not been taking medication the past few weeks. Pt has history of depression, Borderline PD, panic. Pt was BIB parents due to not speaking at home. Pt reports he has stopped smoking marijuana and drinking alcohol.

## 2025-01-31 NOTE — BH INPATIENT PSYCHIATRY ASSESSMENT NOTE - DESCRIPTION
see HPI Patient lives with his parents. 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 Does not currently work. Was dating someone from June 2024 until January 20th, 2025 which preceded the current episode.

## 2025-01-31 NOTE — BH INPATIENT PSYCHIATRY ASSESSMENT NOTE - RISK ASSESSMENT
Patient has chronic elevated risk for self harm given   Static Risk Factors: gender, past suicidal ideation / attempts, prior psychiatric hospitalizations, h/o psychiatric diagnosis, h/o substance use  Chronic risk factors are acutely elevated at this time due to active psychiatric sx, poor sleep patterns, unemployment, acute life stressor , not connected to outpatient care.

## 2025-01-31 NOTE — BH INPATIENT PSYCHIATRY ASSESSMENT NOTE - NSICDXBHTERTIARYDX_PSY_ALL_CORE
R/O Bipolar disorder   F31.9  R/O Post traumatic stress disorder (PTSD)   F43.10  R/O Major depressive disorder   F32.9  R/O Borderline personality disorder   F60.3

## 2025-01-31 NOTE — BH INPATIENT PSYCHIATRY ASSESSMENT NOTE - VIOLENCE PROTECTIVE FACTORS:
Residential stability/Sobriety/Engagement in treatment Residential stability/Engagement in treatment

## 2025-01-31 NOTE — PSYCHIATRIC REHAB INITIAL EVALUATION - NSBHPRRECOMMEND_PSY_ALL_CORE
Writer met with Pt to orient his to Psych Rehab department and its functions. Pt was minimally verbal, guarded and anxious evidence by moving constantly, lateness in speech and rubbing his hands during the session. Pt identified his primary reason of hospitalization as worsening of depression. Pt denies any major stressors in life currently. Per the chart Pt is with past charted psychiatric history of Borderline Personality Disorder, anxiety and panic attacks, MDD and brought in by parents maritza family from [facility] for change in behavior over the lats several days and then being non verbal starting earlier in the day. Writer was able to establish a collaborative goal with Pt to work on the next seven days. Psychiatric Rehabilitation staff will continue to provide encouragement, support, psychotherapy, and psychoeducation to assist the Pt in the progression of his treatment goal and engagement with activities.

## 2025-01-31 NOTE — BH INPATIENT PSYCHIATRY ASSESSMENT NOTE - NSBHCHARTREVIEWVS_PSY_A_CORE FT
Vital Signs Last 24 Hrs  T(C): 36.7 (01-30-25 @ 22:51), Max: 37.2 (01-30-25 @ 20:33)  T(F): 98 (01-30-25 @ 22:51), Max: 98.9 (01-30-25 @ 20:33)  HR: 95 (01-30-25 @ 20:33) (95 - 112)  BP: 150/93 (01-30-25 @ 20:33) (150/93 - 171/93)  BP(mean): --  RR: 18 (01-30-25 @ 20:33) (17 - 18)  SpO2: 97% (01-30-25 @ 20:33) (97% - 99%)    Orthostatic VS  01-30-25 @ 22:51  Lying BP: --/-- HR: --  Sitting BP: 150/89 HR: 108  Standing BP: 137/88 HR: 110  Site: --  Mode: --   Vital Signs Last 24 Hrs  T(C): 36.7 (01-31-25 @ 08:44), Max: 37.2 (01-30-25 @ 20:33)  T(F): 98 (01-31-25 @ 08:44), Max: 98.9 (01-30-25 @ 20:33)  HR: 131 (01-31-25 @ 08:44) (95 - 131)  BP: 128/91 (01-31-25 @ 08:44) (128/91 - 171/93)  BP(mean): --  RR: 18 (01-30-25 @ 20:33) (17 - 18)  SpO2: 97% (01-30-25 @ 20:33) (97% - 99%)    Orthostatic VS  01-30-25 @ 22:51  Lying BP: --/-- HR: --  Sitting BP: 150/89 HR: 108  Standing BP: 137/88 HR: 110  Site: --  Mode: --   Vital Signs Last 24 Hrs  T(C): 36.7 (01-31-25 @ 08:44), Max: 37.2 (01-30-25 @ 20:33)  T(F): 98 (01-31-25 @ 08:44), Max: 98.9 (01-30-25 @ 20:33)  HR: 131 (01-31-25 @ 08:44) (95 - 131)  BP: 128/91 (01-31-25 @ 08:44) (128/91 - 150/93)  BP(mean): --  RR: 18 (01-30-25 @ 20:33) (18 - 18)  SpO2: 97% (01-30-25 @ 20:33) (97% - 97%)    Orthostatic VS  01-30-25 @ 22:51  Lying BP: --/-- HR: --  Sitting BP: 150/89 HR: 108  Standing BP: 137/88 HR: 110  Site: --  Mode: --

## 2025-01-31 NOTE — BH INPATIENT PSYCHIATRY ASSESSMENT NOTE - NSBHMETABOLIC_PSY_ALL_CORE_FT
BMI: BMI (kg/m2): 20.9 (01-30-25 @ 22:51)  HbA1c: A1C with Estimated Average Glucose Result: 4.8 % (01-30-25 @ 17:37)    Glucose:   BP: 150/93 (01-30-25 @ 20:33) (150/93 - 171/93)Vital Signs Last 24 Hrs  T(C): 36.7 (01-30-25 @ 22:51), Max: 37.2 (01-30-25 @ 20:33)  T(F): 98 (01-30-25 @ 22:51), Max: 98.9 (01-30-25 @ 20:33)  HR: 95 (01-30-25 @ 20:33) (95 - 112)  BP: 150/93 (01-30-25 @ 20:33) (150/93 - 171/93)  BP(mean): --  RR: 18 (01-30-25 @ 20:33) (17 - 18)  SpO2: 97% (01-30-25 @ 20:33) (97% - 99%)    Orthostatic VS  01-30-25 @ 22:51  Lying BP: --/-- HR: --  Sitting BP: 150/89 HR: 108  Standing BP: 137/88 HR: 110  Site: --  Mode: --    Lipid Panel: Date/Time: 01-30-25 @ 17:37  Cholesterol, Serum: 163  LDL Cholesterol Calculated: 97  HDL Cholesterol, Serum: 53  Total Cholesterol/HDL Ration Measurement: --  Triglycerides, Serum: 63   BMI: BMI (kg/m2): 20.9 (01-30-25 @ 22:51)  HbA1c: A1C with Estimated Average Glucose Result: 4.8 % (01-30-25 @ 17:37)    Glucose:   BP: 128/91 (01-31-25 @ 08:44) (128/91 - 171/93)Vital Signs Last 24 Hrs  T(C): 36.7 (01-31-25 @ 08:44), Max: 37.2 (01-30-25 @ 20:33)  T(F): 98 (01-31-25 @ 08:44), Max: 98.9 (01-30-25 @ 20:33)  HR: 131 (01-31-25 @ 08:44) (95 - 131)  BP: 128/91 (01-31-25 @ 08:44) (128/91 - 171/93)  BP(mean): --  RR: 18 (01-30-25 @ 20:33) (17 - 18)  SpO2: 97% (01-30-25 @ 20:33) (97% - 99%)    Orthostatic VS  01-30-25 @ 22:51  Lying BP: --/-- HR: --  Sitting BP: 150/89 HR: 108  Standing BP: 137/88 HR: 110  Site: --  Mode: --    Lipid Panel: Date/Time: 01-30-25 @ 17:37  Cholesterol, Serum: 163  LDL Cholesterol Calculated: 97  HDL Cholesterol, Serum: 53  Total Cholesterol/HDL Ration Measurement: --  Triglycerides, Serum: 63   BMI: BMI (kg/m2): 20.9 (01-30-25 @ 22:51)  HbA1c: A1C with Estimated Average Glucose Result: 4.8 % (01-30-25 @ 17:37)    Glucose:   BP: 128/91 (01-31-25 @ 08:44) (128/91 - 171/93)Vital Signs Last 24 Hrs  T(C): 36.7 (01-31-25 @ 08:44), Max: 37.2 (01-30-25 @ 20:33)  T(F): 98 (01-31-25 @ 08:44), Max: 98.9 (01-30-25 @ 20:33)  HR: 131 (01-31-25 @ 08:44) (95 - 131)  BP: 128/91 (01-31-25 @ 08:44) (128/91 - 150/93)  BP(mean): --  RR: 18 (01-30-25 @ 20:33) (18 - 18)  SpO2: 97% (01-30-25 @ 20:33) (97% - 97%)    Orthostatic VS  01-30-25 @ 22:51  Lying BP: --/-- HR: --  Sitting BP: 150/89 HR: 108  Standing BP: 137/88 HR: 110  Site: --  Mode: --    Lipid Panel: Date/Time: 01-30-25 @ 17:37  Cholesterol, Serum: 163  LDL Cholesterol Calculated: 97  HDL Cholesterol, Serum: 53  Total Cholesterol/HDL Ration Measurement: --  Triglycerides, Serum: 63

## 2025-01-31 NOTE — BH INPATIENT PSYCHIATRY ASSESSMENT NOTE - DETAILS
per parents patient with somewhat similar presentation s/p Wellbutrin per chart review: "sister, mother, and father with hx of depression and anxiety. Notes family members are prescribed Xanax and sister prescribed SSRI. Father’s brother  from accidental OD, with hx of substance abuse. Denies family hx of suicide" see HPI Patient guarded. Will continue to assess as patient becomes more comfortable with the treatment team.

## 2025-01-31 NOTE — BH INPATIENT PSYCHIATRY ASSESSMENT NOTE - NSBHASSESSSUMMFT_PSY_ALL_CORE
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, domiciled with parents, no past medical history, with past charted psychiatric history of Borderline Personality Disorder, anxiety and panic attacks, MDD, past charted hx of "experimenting with agents (last semester took Adderall for about 1 week obtained from someone else)" past charted hx of "Cannabis use ~ 1x/week; ETOH use fluctuates from 2x/week to 5x/week; at most drank 10 drinks with blackouts; denies complicated withdrawals/DTs/seizures," previously in treatment at [facility] 12/21/22-5/12/23 s/p being referred there s/p following a suicidal gesture of consuming ETOH and Klonopin, with past inpatient psychiatric admissions on 1S (9.13 direct admit from [facility] in 2024), does not currently see a psychiatrist but attends DBT therapy sessions with Jorje Swann ([phone]), his PCP prescribed Prozac 20mg, later reduced to 10mg, who was brought in by family from [facility] for change in behavior over the lats several days and then being non verbal starting earlier in the day.  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, domiciled with parents, no past medical history, with past charted psychiatric history of Borderline Personality Disorder, anxiety and panic attacks, MDD, past charted hx of "experimenting with agents (last semester took Adderall for about 1 week obtained from someone else)" past charted hx of "Cannabis use ~ 1x/week; ETOH use fluctuates from 2x/week to 5x/week; at most drank 10 drinks with blackouts; denies complicated withdrawals/DTs/seizures," previously in treatment at [place] 12/21/22-5/12/23 s/p being referred there s/p following a suicidal gesture of consuming ETOH and Klonopin, with past inpatient psychiatric admissions on 1S (9.13 direct admit from [facility] in 2024), does not currently see a psychiatrist but attends DBT therapy sessions with Jorje Swann ([phone]), his PCP prescribed Prozac 20mg, later reduced to 10mg, who was brought in by family from [facility] for change in behavior over the lats several days and then being non verbal starting earlier in the day. Patient was admitted from the ED to  on 9.39.    In Crisis Center there was concern for possible manic episode in the setting of restarting fluoxetine. In the ED, there were concerns for catatonia, psychosis. On evaluation on 1N, patient appears guarded and depressed, but does not exhibit signs or symptoms of cornel, psychosis, or catatonia. Patient notes recent breakup on January 20th which precipitated current episode. Has been having SI but is unwilling to discuss the details of these thoughts. Patient also unwilling to discuss trauma at this time. Differential diagnosis includes borderline personality disorder, major depressive disorder, bipolar disorder, substance-induced mood disorder, and PTSD. Per collateral with patient's family, he has done well on fluoxetine in the past and was not able to tolerate aripiprazole or bupropion. Will restart fluoxetine 20 mg daily and start quetiapine 50 mg nightly for antidepressant augmentation, mood stabilization, and insomnia.    Plan  Admit to  on 9.39, emergency.  Restart fluoxetine 20 mg daily as patient and his family feel that fluoxetine has helped him in the past.  Start quetiapine 50 mg nightly for mood given concerns for possible cornel and intolerance of low dose aripiprazole.   Lorazepam PO/IM PRNs for anxiety, agitation.  Individual, group, and milieu therapy.  Dispo pending stabilization.

## 2025-01-31 NOTE — BH INPATIENT PSYCHIATRY ASSESSMENT NOTE - ADDITIONAL DETAILS ALL
preparatory acts as per chart review; interrupted attempt as per chart review --  Pt was referred to [facility] following a suicidal gesture of consuming ETOH and Klonopin, his friends on campus checked on him and called campus security.  unable to assess past history beyond collateral and chart review given patient's mental status.

## 2025-01-31 NOTE — BH INPATIENT PSYCHIATRY ASSESSMENT NOTE - NSBHATTESTCOMMENTATTENDFT_PSY_A_CORE
Patient seen by me at length in conference room for initial inpatient interview with resident observing.   I was physically present during the service to the patient and personally examined the patient and I was directly involved in the management plan and recommendations of the care provided to the patient.   I have reviewed the admission record and reviewed the patient’s physical examination, review of systems and there are no pertinent positives, and admission labs. I have discussed the case with the resident and I have reviewed the resident's progress note. I agree with the progress note’s contents and I have made changes as indicated.      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 [facility], domiciled with parents, no past medical history, with past charted psychiatric history of Borderline Personality Disorder, anxiety and panic attacks, MDD, past charted hx of "experimenting with agents (last semester took Adderall for about 1 week obtained from someone else)" past charted hx of "Cannabis use ~ 1x/week; ETOH use fluctuates from 2x/week to 5x/week; at most drank 10 drinks with blackouts; denies complicated withdrawals/DTs/seizures," previously in treatment at [place] 12/21/22-5/12/23 s/p being referred there s/p following a suicidal gesture of consuming ETOH and Klonopin, with past inpatient psychiatric admissions on 1S (9.13 direct admit from [facility] in 2024), does not currently see a psychiatrist but attends DBT therapy sessions with Jorje Swann ([phone]), his PCP prescribed Prozac 20mg, later reduced to 10mg, who was brought in by family from [facility] for change in behavior over the lats several days and then being non verbal starting earlier in the day. Patient was admitted from the ED to 1N on 9.39    On exam with different presentation that at either Crisis or in ER. Able to answer questions and though speaking in a low tone with no signs of Catatonia and no signs of Sudha  Patient had been off of meds for many months and symptoms began after break up with GF  Has been back on meds a few weeks but with intrusive thoughts of being better off dead. Denies any plan but was felt to be withholding  Parents describe hx of improvement with SGA aripiprazole but with significant tremor  In light of this h and fluctuating presentations will add low dose SGA and start Quetiapine 50 mg HS

## 2025-01-31 NOTE — PSYCHIATRIC REHAB INITIAL EVALUATION - NSBHALCSUBCHOICE_PSY_ALL_CORE
Past charted hx of "Cannabis use ~ 1x/week; ETOH use fluctuates from 2x/week to 5x/week; at most drank 10 drinks with blackouts;

## 2025-01-31 NOTE — BH INPATIENT PSYCHIATRY ASSESSMENT NOTE - NSBHCHARTREVIEWLAB_PSY_A_CORE FT
Trilobed Flap Text: The defect edges were debeveled with a #15 scalpel blade.  Given the location of the defect and the proximity to free margins a trilobed flap was deemed most appropriate.  Using a sterile surgical marker, an appropriate trilobed flap drawn around the defect.    The area thus outlined was incised deep to adipose tissue with a #15 scalpel blade.  The skin margins were undermined to an appropriate distance in all directions utilizing iris scissors.   LABS:                          14.9   14.03 )-----------( 397      ( 30 Jan 2025 17:37 )             42.7     01-30    144  |  104  |  16  ----------------------------<  99  3.3[L]   |  21[L]  |  0.91    Ca    10.5      30 Jan 2025 17:37    TPro  8.2  /  Alb  5.2[H]  /  TBili  0.7  /  DBili  x   /  AST  19  /  ALT  14  /  AlkPhos  69  01-30    LIVER FUNCTIONS - ( 30 Jan 2025 17:37 )  Alb: 5.2 g/dL / Pro: 8.2 g/dL / ALK PHOS: 69 U/L / ALT: 14 U/L / AST: 19 U/L / GGT: x             Urinalysis Basic - ( 30 Jan 2025 17:37 )    Color: x / Appearance: x / SG: x / pH: x  Gluc: 99 mg/dL / Ketone: x  / Bili: x / Urobili: x   Blood: x / Protein: x / Nitrite: x   Leuk Esterase: x / RBC: x / WBC x   Sq Epi: x / Non Sq Epi: x / Bacteria: x

## 2025-01-31 NOTE — BH INPATIENT PSYCHIATRY ASSESSMENT NOTE - MSE UNSTRUCTURED FT
Patient is depressed and guarded with poor eye contact.  Appearance: Dressed in hospital scrubs. Fair hygiene.  Motor: No PMA/PMR  Gait/station: Ambulating without issue.  Speech: Slow and quiet.  Mood: "Going through some stuff"  Affect: Depressed  Thought Process: Linear, goal directed  Thought Content: Patient with SI but unwilling to discuss details at this time. No HI. No delusions.   Patient not having AVH at this time.  Fund of knowledge fair.  Insight and judgement are limited. Impulse control is fair at this time.

## 2025-01-31 NOTE — BH INPATIENT PSYCHIATRY ASSESSMENT NOTE - HPI (INCLUDE ILLNESS QUALITY, SEVERITY, DURATION, TIMING, CONTEXT, MODIFYING FACTORS, ASSOCIATED SIGNS AND SYMPTOMS)
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 [place] [facility], domiciled with parents, no past medical history, with past charted psychiatric history of Borderline Personality Disorder, anxiety and panic attacks, MDD, past charted hx of "experimenting with agents (last semester took Adderall for about 1 week obtained from someone else)" past charted hx of "Cannabis use ~ 1x/week; ETOH use fluctuates from 2x/week to 5x/week; at most drank 10 drinks with blackouts; denies complicated withdrawals/DTs/seizures," previously in treatment at [facility] 12/21/22-5/12/23 s/p being referred there s/p following a suicidal gesture of consuming ETOH and Klonopin, with past inpatient psychiatric admissions on 1S (9.13 direct admit from [facility] in 2024), does not currently see a psychiatrist but attends DBT therapy sessions with Jorje Swann ([phone]), his PCP prescribed Prozac 20mg, later reduced to 10mg, who was brought in by family from [facility] for change in behavior over the lats several days and then being non verbal starting earlier in the day.      On evaluation, patient is alert, calm, cooperative, however, oddly related, with significantly speech latency, slow and soft speech, poverty of thought, poor eye contact and largely repeating self throughout interview.  When asked what brings patient to the emergency room, he states "my parents brought me here."  Writer asks why he thinks parents worried about him and he states "I was just like...." and does not finish sentence.  Writer prompts patient again, and he states "I was just picking at my skin."  Writer asks follow up open ended questions and patient states "yeah..." and just nodding head indiscriminately.  Patient continues to stare at the floor, looking away, and shaking his head.  Writer asks why he is shaking his head, and he states "um...yeah" "I was uh..."  Patient then states "I was going through a tough time."  Writer asks patient to clarify and patient states "I was like....yeah." Writer asks patient if he is feeling anxious as he appears to be and he states "I haven't really been myself lately."  Writer asks for clarification again and he states "im in shock right now." and clarified "I...uh spent like a while just not really taking care of myself." and clarified that he has not been sleeping but unable to answer any details about recent sleep patterns.  Reports mood has been "ok."  Denies depressed, sad or tearful mood, denies euphoria or irritability.  Denies SI but not able to take partake towards meaningful safety planning given mental status exam.  Patient denies alcohol, cigarette, drug use or taking medication not as Rxed or overdose on any pills / substances.   Denies AVH.  Denies HI. Interview is terminated as patient not able to engage in further questions and answers with latency and not finishing his sentences.     Brian Keenan: Patient speaks less than 20 words per 5 min (+2),and with some starting (+1), No other positive findings on scale.     Collateral obtained by [facility].  Reviewed.  Collateral from [facility] as below.     "A patient from [facility] is on his way to the ED for evaluation, Jimbo Guillermotisha [birth year], i am writing handoff now. possible manic episode with psychosis - patient is non-verbal with high BP & HR needing medical clearance   Patient is a 24 year old  male domiciled in private residence in [place], [place] with mother and father. Patient presents as non-verbal and internally preoccupied. Patient looking out the window and rocking back and forth in chair. Patient unable to make eye contact with others. Patient has a hx of two inpatient hospitalizations (2022 & January 2024) due to depressive symptoms. Most recent hospitalization due to suicide note left for family with plan.  Patient currently in treatment at [facility]. Patient was discharged in 2024 on Prozac 40mg and 1mg abilify. Parents report that patient stopped medication in August of 2024 due to feeling better, more positive, and continued with therapy. Parents report that patient broke up with his girlfriend in early january 2025. He began experiencing negative thoughts about self/situation. Patient re-started Prozac 20mg with PCP 2.5 weeks ago for approximately 1 week and reported "not feeling right" so PCP decreased to 10mg. Patient has been on 10mg. Parents report that on Wednesday morning at 5am, patient woke them up in their bedroom and appeared hyperverbal and happy to discuss his "plan for the future" with a completed vision board. He reported feeling "really great." Parents state that, at approximately 3pm that day, the patient began saying things like, "I have to keep moving" and would walk up and down the stairs for hours on end. He reported telling parents that took a bath but was wearing the same clothes that he wore prior to bath.   This morning, patient presented as catatonic and was just "standing there."    He has not spoken or ate today. SWer unable to complete assessment due to patient's presentation at this time. Mother and father do have healthcare proxy rights as well as HIPAA forms & they have paperwork with them.  Thank you, Please let me know if you have any other questions.   ZavalaIvone gilessca Patient is a 24 year old  male domiciled in private residence in [place], [place] with mother and father. Patient presents as non-verbal and internally preoccupied. Patient looking out the window and rocking back and forth in chair. Patient unable to make eye contact with others. Patient has a … Vital Signs in Crisis Center:  BP:172/101 P:115 WT:146 HT:5'11" TEMP: 98.3     mother gave him a 1mg xanax this morning as she thought he was "too anxious to speak" but that did not do anything to assist pt's presentation.  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 [place] [facility], domiciled with parents, no past medical history, with past charted psychiatric history of Borderline Personality Disorder, anxiety and panic attacks, MDD, past charted hx of "experimenting with agents (last semester took Adderall for about 1 week obtained from someone else)" past charted hx of "Cannabis use ~ 1x/week; ETOH use fluctuates from 2x/week to 5x/week; at most drank 10 drinks with blackouts; denies complicated withdrawals/DTs/seizures," previously in treatment at [place] 12/21/22-5/12/23 s/p being referred there s/p following a suicidal gesture of consuming ETOH and Klonopin, with past inpatient psychiatric admissions on 1S (9.13 direct admit from [facility] in 2024), does not currently see a psychiatrist but attends DBT therapy sessions with Jorje Swann ([phone]), his PCP prescribed Prozac 20mg, later reduced to 10mg, who was brought in by family from [facility] for change in behavior over the last several days and then being non verbal starting earlier in the day. Patient was admitted from the ED to 1N on 9.39.    On evaluation on 1N, patient appears guarded and depressed, but does not exhibit signs or symptoms of cornel, psychosis, or catatonia. Patient states that he wishes he would have participated with the interviews in the Crisis Center and ED. Patient says that he was doing well taking fluoxetine and aripiprazole and participating in DBT outpatient program since discharge from [facility] 1S in January 2024. Patient began dating someone in June 2024, and decided that he was doing well enough to discontinue his medications in July 2024. Patients notes that he had a breakup on January 20th, 2025 which triggered his current episode. Patient's PCP tried to restart fluoxetine 20 mg, and then reduced it to 10 mg because he was not feeling well. Describes a "cornel-like" episode that last one day earlier this week. Patient has had depressed mood, anhedonia, poor sleep, ruminations and guilt, and suicidal ideation which he describes as "intrusive thoughts". Patient avoid discussions about the details of his suicidal ideation. Patient with a history of suicidal gesture by taking alcohol and clonazepam which lead to his admission to , but also does not want to discuss the details of this event. Patient also endorses NSSIB by skin picking. Patient says that he previously used alcohol and marijuana heavily, but has not been using them during the last few weeks. He is not having HI, AVH, or delusions.    Per collateral with the patient's parents. He had previously done well on fluoxetine until he had a suicide attempt. Aripiprazole 2 mg was added to his regimen, but he developed tremors and was decreased to 1 mg. He then had a trial of bupropion monotherapy, but became restless and was switched back to fluoxetine and aripiprazole 1 mg.    Per ED Psych Evaluation:  On evaluation, patient is alert, calm, cooperative, however, oddly related, with significantly speech latency, slow and soft speech, poverty of thought, poor eye contact and largely repeating self throughout interview.  When asked what brings patient to the emergency room, he states "my parents brought me here."  Writer asks why he thinks parents worried about him and he states "I was just like...." and does not finish sentence.  Writer prompts patient again, and he states "I was just picking at my skin."  Writer asks follow up open ended questions and patient states "yeah..." and just nodding head indiscriminately.  Patient continues to stare at the floor, looking away, and shaking his head.  Writer asks why he is shaking his head, and he states "um...yeah" "I was uh..."  Patient then states "I was going through a tough time."  Writer asks patient to clarify and patient states "I was like....yeah." Writer asks patient if he is feeling anxious as he appears to be and he states "I haven't really been myself lately."  Writer asks for clarification again and he states "im in shock right now." and clarified "I...uh spent like a while just not really taking care of myself." and clarified that he has not been sleeping but unable to answer any details about recent sleep patterns.  Reports mood has been "ok."  Denies depressed, sad or tearful mood, denies euphoria or irritability.  Denies SI but not able to take partake towards meaningful safety planning given mental status exam.  Patient denies alcohol, cigarette, drug use or taking medication not as Rxed or overdose on any pills / substances.   Denies AVH.  Denies HI. Interview is terminated as patient not able to engage in further questions and answers with latency and not finishing his sentences.     Brian Keenan: Patient speaks less than 20 words per 5 min (+2),and with some starting (+1), No other positive findings on scale.     Per [facility] Evaluation:  "A patient from [facility] is on his way to the ED for evaluation, Jimbo Romero 7/[birth year], i am writing handoff now. possible manic episode with psychosis - patient is non-verbal with high BP & HR needing medical clearance   Patient is a 24 year old  male domiciled in private residence in [place], [place] with mother and father. Patient presents as non-verbal and internally preoccupied. Patient looking out the window and rocking back and forth in chair. Patient unable to make eye contact with others. Patient has a hx of two inpatient hospitalizations (2022 & January 2024) due to depressive symptoms. Most recent hospitalization due to suicide note left for family with plan.  Patient currently in treatment at [facility]. Patient was discharged in 2024 on Prozac 40mg and 1mg abilify. Parents report that patient stopped medication in August of 2024 due to feeling better, more positive, and continued with therapy. Parents report that patient broke up with his girlfriend in early january 2025. He began experiencing negative thoughts about self/situation. Patient re-started Prozac 20mg with PCP 2.5 weeks ago for approximately 1 week and reported "not feeling right" so PCP decreased to 10mg. Patient has been on 10mg. Parents report that on Wednesday morning at 5am, patient woke them up in their bedroom and appeared hyperverbal and happy to discuss his "plan for the future" with a completed vision board. He reported feeling "really great." Parents state that, at approximately 3pm that day, the patient began saying things like, "I have to keep moving" and would walk up and down the stairs for hours on end. He reported telling parents that took a bath but was wearing the same clothes that he wore prior to bath.   This morning, patient presented as catatonic and was just "standing there."    He has not spoken or ate today. SWer unable to complete assessment due to patient's presentation at this time. Mother and father do have healthcare proxy rights as well as HIPAA forms & they have paperwork with them.  Thank you, Please let me know if you have any other questions.   Maribeth Zavala Patient is a 24 year old  male domiciled in private residence in [place], [place] with mother and father. Patient presents as non-verbal and internally preoccupied. Patient looking out the window and rocking back and forth in chair. Patient unable to make eye contact with others. Patient has a … Vital Signs in Crisis Center:  BP:172/101 P:115 WT:146 HT:5'11" TEMP: 98.3   mother gave him a 1mg xanax this morning as she thought he was "too anxious to speak" but that did not do anything to assist pt's presentation."

## 2025-01-31 NOTE — BH INPATIENT PSYCHIATRY ASSESSMENT NOTE - CURRENT MEDICATION
MEDICATIONS  (STANDING):  ARIPiprazole 2 milliGRAM(s) Oral at bedtime  influenza   Vaccine 0.5 milliLiter(s) IntraMuscular once    MEDICATIONS  (PRN):  haloperidol     Tablet 5 milliGRAM(s) Oral every 6 hours PRN agitation 2/2 mood d/o  haloperidol    Injectable 5 milliGRAM(s) IntraMuscular once PRN agitation 2/2 mood d/o  hydrOXYzine hydrochloride 50 milliGRAM(s) Oral every 8 hours PRN Anxiety  LORazepam     Tablet 2 milliGRAM(s) Oral every 6 hours PRN anxiety 2/2 mood d/o  LORazepam   Injectable 2 milliGRAM(s) IntraMuscular once PRN anxiety 2/2 mood d/o   MEDICATIONS  (STANDING):  FLUoxetine 20 milliGRAM(s) Oral daily  influenza   Vaccine 0.5 milliLiter(s) IntraMuscular once  QUEtiapine 50 milliGRAM(s) Oral at bedtime    MEDICATIONS  (PRN):  haloperidol     Tablet 5 milliGRAM(s) Oral every 6 hours PRN agitation 2/2 mood d/o  haloperidol    Injectable 5 milliGRAM(s) IntraMuscular once PRN agitation 2/2 mood d/o  hydrOXYzine hydrochloride 50 milliGRAM(s) Oral every 8 hours PRN Anxiety  LORazepam     Tablet 2 milliGRAM(s) Oral every 6 hours PRN anxiety 2/2 mood d/o  LORazepam   Injectable 2 milliGRAM(s) IntraMuscular once PRN anxiety 2/2 mood d/o   MEDICATIONS  (STANDING):  FLUoxetine 20 milliGRAM(s) Oral daily  influenza   Vaccine 0.5 milliLiter(s) IntraMuscular once  QUEtiapine 50 milliGRAM(s) Oral at bedtime    MEDICATIONS  (PRN):  hydrOXYzine hydrochloride 50 milliGRAM(s) Oral every 8 hours PRN Anxiety  LORazepam     Tablet 2 milliGRAM(s) Oral every 6 hours PRN anxiety 2/2 mood d/o  LORazepam   Injectable 2 milliGRAM(s) IntraMuscular once PRN anxiety 2/2 mood d/o

## 2025-02-01 PROCEDURE — 99232 SBSQ HOSP IP/OBS MODERATE 35: CPT

## 2025-02-01 RX ADMIN — Medication 1 MILLIGRAM(S): at 16:12

## 2025-02-01 RX ADMIN — Medication 1 MILLIGRAM(S): at 13:08

## 2025-02-01 RX ADMIN — QUETIAPINE FUMARATE 50 MILLIGRAM(S): 300 TABLET ORAL at 21:29

## 2025-02-01 RX ADMIN — Medication 20 MILLIGRAM(S): at 09:32

## 2025-02-01 RX ADMIN — Medication 1 MILLIGRAM(S): at 21:31

## 2025-02-01 NOTE — BH INPATIENT PSYCHIATRY PROGRESS NOTE - CURRENT MEDICATION
MEDICATIONS  (STANDING):  FLUoxetine 20 milliGRAM(s) Oral daily  influenza   Vaccine 0.5 milliLiter(s) IntraMuscular once  LORazepam     Tablet 1 milliGRAM(s) Oral every 3 hours  QUEtiapine 50 milliGRAM(s) Oral at bedtime    MEDICATIONS  (PRN):  hydrOXYzine hydrochloride 50 milliGRAM(s) Oral every 8 hours PRN Anxiety  LORazepam     Tablet 2 milliGRAM(s) Oral every 6 hours PRN anxiety 2/2 mood d/o  LORazepam   Injectable 2 milliGRAM(s) IntraMuscular once PRN anxiety 2/2 mood d/o

## 2025-02-01 NOTE — BH INPATIENT PSYCHIATRY PROGRESS NOTE - NSBHATTESTBILLING_PSY_A_CORE
99222-Initial OBS or IP - moderate complexity OR 55-74 mins negative 22041-Leezrcypcr OBS or IP - moderate complexity OR 35-49 mins

## 2025-02-01 NOTE — BH CHART NOTE - NSEVENTNOTEFT_PSY_ALL_CORE
BANDAR paged at 0759 due to pt with elevated BP reading. Automated sitting /134, standing 161/122. Manual retaken 15 mins later sitting was 150/82. Per flowsheets, pt noted to have elevated BP readings on 1/30 with sitting /89 and standing /88 with  and 110, respectively. Per RN, pt asymptomatic. Pt without h/o HTN. HTN may be reactive given hospitalization and anxiety. Advised RN to ensure pt is adequately hydrated and continue to trend BPs.  Advised RN to page BANDAR if pt becomes symptomatic. Primary team to consider medicine consult if BP continues to be elevated to consider starting antihypertensive.

## 2025-02-01 NOTE — BH INPATIENT PSYCHIATRY PROGRESS NOTE - NSBHASSESSSUMMFT_PSY_ALL_CORE
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 [place] [facility], domiciled with parents, no past medical history, with past charted psychiatric history of Borderline Personality Disorder, anxiety and panic attacks, MDD, past charted hx of "experimenting with agents (last semester took Adderall for about 1 week obtained from someone else)" past charted hx of "Cannabis use ~ 1x/week; ETOH use fluctuates from 2x/week to 5x/week; at most drank 10 drinks with blackouts; denies complicated withdrawals/DTs/seizures," previously in treatment at [facility] 12/21/22-5/12/23 s/p being referred there s/p following a suicidal gesture of consuming ETOH and Klonopin, with past inpatient psychiatric admissions on 1S (9.13 direct admit from [facility] in 2024), does not currently see a psychiatrist but attends DBT therapy sessions with Jorje Swann ([phone]), his PCP prescribed Prozac 20mg, later reduced to 10mg, who was brought in by family from [facility] for change in behavior over the lats several days and then being non verbal starting earlier in the day.      On evaluation, patient presented as depressed, anxious, constricted affect, poor eye contact/ relatedness, soft volume, slow rate, decreased productivity, increased latency, minimally responsive, w/ poverty of thought, and poor sleep, with sx in context of possible ?hypomanic symptoms earlier in the week (as indicated by collateral note), with inability to engage in meaningful evaluation / safety planning due to sx severity..  At this time, etiology of symptoms are unclear, however, patient sx severity appears to put him at risk for inadvertent imminent harm to self and impairment in functioning. Ddx includes acute severe depression, bipolar / unspecified mood disorder, severe anxiety / stress reaction, burgeoning negative sx/ catatonia, or mood d/o with psychotic features , vs substance induced given +THC. For now, will restart patient on Abilify for its mood stabilizing / antidepressant and antipsychotic properties with good response to same on past admission until further diagnostic clarity w/ more longitudinal follow up on inpatient unit.      1/31- pt was verbal on the unit, able to participate with interview  2/1- pt is selectively mute; visible, but no talking; Ativan started 1mg TID; monitor BP and symptoms.     Recommendations:   -admit on 9.39 legal status, pending medical clearance, f/u CPK and EKG  -start Abilify 2mg PO qhs   -Hydroxyzine 50mg PO PRN q8h for anxiety   -psychoeducation and MI regarding destabilizing effects of substances on mental health  -For agitation please attempt verbal de-escalation and behavioral intervention first. If patient does not respond to above, may give haldol 5 mg po q6h prn, ativan 2 mg po q6h prn if no contraindications. If patient is refusing PO, remains an imminent danger to self or others and If Patient's  qtc <500, can escalate to IM formulation. If IM antipsychotic is administered, please perform follow-up ECG for QTc monitoring.

## 2025-02-02 PROCEDURE — 99232 SBSQ HOSP IP/OBS MODERATE 35: CPT

## 2025-02-02 RX ADMIN — Medication 1 MILLIGRAM(S): at 20:35

## 2025-02-02 RX ADMIN — QUETIAPINE FUMARATE 50 MILLIGRAM(S): 300 TABLET ORAL at 20:35

## 2025-02-02 RX ADMIN — Medication 20 MILLIGRAM(S): at 08:28

## 2025-02-02 RX ADMIN — Medication 1 MILLIGRAM(S): at 06:15

## 2025-02-02 RX ADMIN — Medication 1 MILLIGRAM(S): at 08:28

## 2025-02-02 NOTE — BH INPATIENT PSYCHIATRY PROGRESS NOTE - NSBHASSESSSUMMFT_PSY_ALL_CORE
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 [place] [facility], domiciled with parents, no past medical history, with past charted psychiatric history of Borderline Personality Disorder, anxiety and panic attacks, MDD, past charted hx of "experimenting with agents (last semester took Adderall for about 1 week obtained from someone else)" past charted hx of "Cannabis use ~ 1x/week; ETOH use fluctuates from 2x/week to 5x/week; at most drank 10 drinks with blackouts; denies complicated withdrawals/DTs/seizures," previously in treatment at [facility] 12/21/22-5/12/23 s/p being referred there s/p following a suicidal gesture of consuming ETOH and Klonopin, with past inpatient psychiatric admissions on 1S (9.13 direct admit from [facility] in 2024), does not currently see a psychiatrist but attends DBT therapy sessions with Jorje Swann ([phone]), his PCP prescribed Prozac 20mg, later reduced to 10mg, who was brought in by family from [facility] for change in behavior over the lats several days and then being non verbal starting earlier in the day.      On evaluation, patient presented as depressed, anxious, constricted affect, poor eye contact/ relatedness, soft volume, slow rate, decreased productivity, increased latency, minimally responsive, w/ poverty of thought, and poor sleep, with sx in context of possible ?hypomanic symptoms earlier in the week (as indicated by collateral note), with inability to engage in meaningful evaluation / safety planning due to sx severity..  At this time, etiology of symptoms are unclear, however, patient sx severity appears to put him at risk for inadvertent imminent harm to self and impairment in functioning. Ddx includes acute severe depression, bipolar / unspecified mood disorder, severe anxiety / stress reaction, burgeoning negative sx/ catatonia, or mood d/o with psychotic features , vs substance induced given +THC. For now, will restart patient on Abilify for its mood stabilizing / antidepressant and antipsychotic properties with good response to same on past admission until further diagnostic clarity w/ more longitudinal follow up on inpatient unit.      1/31- pt was verbal on the unit, able to participate with interview  2/1- pt is selectively mute; visible, but no talking; Ativan started  2/2; pt improved, continue Ativan; pt tolerating prozac and seroquel      Recommendations:   -admit on 9.39 legal status, pending medical clearance, f/u CPK and EKG  -Abilify stopped, changed to Seroquel per primary team; pt tolerating.  -Hydroxyzine 50mg PO PRN q8h for anxiety   -psychoeducation and MI regarding destabilizing effects of substances on mental health  -For agitation please attempt verbal de-escalation and behavioral intervention first. If patient does not respond to above, may give haldol 5 mg po q6h prn, ativan 2 mg po q6h prn if no contraindications. If patient is refusing PO, remains an imminent danger to self or others and If Patient's  qtc <500, can escalate to IM formulation. If IM antipsychotic is administered, please perform follow-up ECG for QTc monitoring.

## 2025-02-02 NOTE — BH INPATIENT PSYCHIATRY PROGRESS NOTE - CURRENT MEDICATION
MEDICATIONS  (STANDING):  FLUoxetine 20 milliGRAM(s) Oral daily  influenza   Vaccine 0.5 milliLiter(s) IntraMuscular once  LORazepam     Tablet 1 milliGRAM(s) Oral at bedtime  QUEtiapine 50 milliGRAM(s) Oral at bedtime    MEDICATIONS  (PRN):  hydrOXYzine hydrochloride 50 milliGRAM(s) Oral every 8 hours PRN Anxiety  LORazepam     Tablet 2 milliGRAM(s) Oral every 6 hours PRN anxiety 2/2 mood d/o  LORazepam   Injectable 2 milliGRAM(s) IntraMuscular once PRN anxiety 2/2 mood d/o

## 2025-02-03 PROCEDURE — 99233 SBSQ HOSP IP/OBS HIGH 50: CPT

## 2025-02-03 RX ADMIN — Medication 1 MILLIGRAM(S): at 09:01

## 2025-02-03 RX ADMIN — Medication 0.5 MILLIGRAM(S): at 15:17

## 2025-02-03 RX ADMIN — Medication 1 MILLIGRAM(S): at 21:05

## 2025-02-03 RX ADMIN — Medication 20 MILLIGRAM(S): at 09:01

## 2025-02-03 RX ADMIN — QUETIAPINE FUMARATE 50 MILLIGRAM(S): 300 TABLET ORAL at 21:05

## 2025-02-03 NOTE — BH INPATIENT PSYCHIATRY PROGRESS NOTE - CURRENT MEDICATION
MEDICATIONS  (STANDING):  FLUoxetine 20 milliGRAM(s) Oral daily  influenza   Vaccine 0.5 milliLiter(s) IntraMuscular once  LORazepam     Tablet 0.5 milliGRAM(s) Oral <User Schedule>  LORazepam     Tablet 1 milliGRAM(s) Oral <User Schedule>  QUEtiapine 50 milliGRAM(s) Oral at bedtime    MEDICATIONS  (PRN):  hydrOXYzine hydrochloride 50 milliGRAM(s) Oral every 8 hours PRN Anxiety  LORazepam     Tablet 2 milliGRAM(s) Oral every 6 hours PRN anxiety 2/2 mood d/o  LORazepam   Injectable 2 milliGRAM(s) IntraMuscular once PRN anxiety 2/2 mood d/o

## 2025-02-03 NOTE — BH INPATIENT PSYCHIATRY PROGRESS NOTE - NSBHASSESSSUMMFT_PSY_ALL_CORE
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 [place] [facility], domiciled with parents, no past medical history, with past charted psychiatric history of Borderline Personality Disorder, anxiety and panic attacks, MDD, past charted hx of "experimenting with agents (last semester took Adderall for about 1 week obtained from someone else)" past charted hx of "Cannabis use ~ 1x/week; ETOH use fluctuates from 2x/week to 5x/week; at most drank 10 drinks with blackouts; denies complicated withdrawals/DTs/seizures," previously in treatment at [place] 12/21/22-5/12/23 s/p being referred there s/p following a suicidal gesture of consuming ETOH and Klonopin, with past inpatient psychiatric admissions on 1S (9.13 direct admit from [facility] in 2024), does not currently see a psychiatrist but attends DBT therapy sessions with Jorje Swann ([phone]), his PCP prescribed Prozac 20mg, later reduced to 10mg, who was brought in by family from [facility] for change in behavior over the lats several days and then being non verbal starting earlier in the day. Patient was admitted from the ED to 1N on 9.39.    In Crisis Center there was concern for possible manic episode in the setting of restarting fluoxetine. In the ED, there were concerns for catatonia, psychosis. On evaluation on 1N, patient appears guarded and depressed, but does not exhibit signs or symptoms of cornel, psychosis, or catatonia. Patient notes recent breakup on January 20th which precipitated current episode. Has been having SI but is unwilling to discuss the details of these thoughts. Patient also unwilling to discuss trauma at this time. Differential diagnosis includes borderline personality disorder, major depressive disorder, bipolar disorder, substance-induced mood disorder, and PTSD. Per collateral with patient's family, he has done well on fluoxetine in the past and was not able to tolerate aripiprazole or bupropion. Will restart fluoxetine 20 mg daily and start quetiapine 50 mg nightly for antidepressant augmentation, mood stabilization, and insomnia.    2/3 Update  Patient remains depressed and guarded, but participating in interview.  Does not appear to have significant catatonic symptoms on exam this AM, but subtle catatonia cannot be excluded.  Decrease lorazepam to 0.5 mg at 7AM, 2PM and 1 mg at 10 PM.  Will continue fluoxetine 20 mg daily and quetiapine 50 mg nightly.    1/31- pt was verbal on the unit, able to participate with interview  2/1- pt is selectively mute; visible, but no talking; lorazepam started  2/2 - pt improved, continue lorazepam; pt tolerating fluoxetine and quetiapine    Plan  Admit to  on 9.39, emergency.  Restart fluoxetine 20 mg daily as patient and his family feel that fluoxetine has helped him in the past.  Start quetiapine 50 mg nightly for mood given concerns for possible cornel and intolerance of low dose aripiprazole.  Lorazepam TID for concerns for catatonia.  Lorazepam PO/IM PRNs for anxiety, agitation.  Individual, group, and milieu therapy.  Dispo pending stabilization. 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 [place] [facility], domiciled with parents, no past medical history, with past charted psychiatric history of Borderline Personality Disorder, anxiety and panic attacks, MDD, past charted hx of "experimenting with agents (last semester took Adderall for about 1 week obtained from someone else)" past charted hx of "Cannabis use ~ 1x/week; ETOH use fluctuates from 2x/week to 5x/week; at most drank 10 drinks with blackouts; denies complicated withdrawals/DTs/seizures," previously in treatment at [place] 12/21/22-5/12/23 s/p being referred there s/p following a suicidal gesture of consuming ETOH and Klonopin, with past inpatient psychiatric admissions on 1S (9.13 direct admit from [facility] in 2024), does not currently see a psychiatrist but attends DBT therapy sessions with Jorje Swann ([phone]), his PCP prescribed Prozac 20mg, later reduced to 10mg, who was brought in by family from [facility] for change in behavior over the lats several days and then being non verbal starting earlier in the day. Patient was admitted from the ED to 1N on 9.39.    In Crisis Center there was concern for possible manic episode in the setting of restarting fluoxetine. In the ED, there were concerns for catatonia, psychosis. On evaluation on 1N, patient appears guarded and depressed, but does not exhibit signs or symptoms of cornel, psychosis, or catatonia. Patient notes recent breakup on January 20th which precipitated current episode. Has been having SI but is unwilling to discuss the details of these thoughts. Patient also unwilling to discuss trauma at this time. Differential diagnosis includes borderline personality disorder, major depressive disorder, bipolar disorder, substance-induced mood disorder, and PTSD. Per collateral with patient's family, he has done well on fluoxetine in the past and was not able to tolerate aripiprazole or bupropion. Will restart fluoxetine 20 mg daily and start quetiapine 50 mg nightly for antidepressant augmentation, mood stabilization, and insomnia.    2/3 Update  Patient remains depressed and guarded, but participating in interview.  Does not appear to have significant catatonic symptoms on exam this AM, but subtle catatonia cannot be excluded.  Decrease lorazepam to 0.5 mg at 7AM, 2PM and 1 mg at 10 PM.  Will continue fluoxetine 20 mg daily and quetiapine 100 mg nightly.    1/31- pt was verbal on the unit, able to participate with interview  2/1- pt is selectively mute; visible, but no talking; lorazepam started  2/2 - pt improved, continue lorazepam; pt tolerating fluoxetine and quetiapine    Plan  Admit to  on 9.39, emergency.  Restart fluoxetine 20 mg daily as patient and his family feel that fluoxetine has helped him in the past.  Start quetiapine 50 mg nightly 1/31 and 100 mg 2/3 for mood given concerns for possible cornel and intolerance of low dose aripiprazole.  Lorazepam TID for concerns for catatonia.  Lorazepam PO/IM PRNs for anxiety, agitation.  Individual, group, and milieu therapy.  Dispo pending stabilization.

## 2025-02-04 PROBLEM — F32.9 MAJOR DEPRESSIVE DISORDER, SINGLE EPISODE, UNSPECIFIED: Chronic | Status: ACTIVE | Noted: 2025-01-30

## 2025-02-04 PROCEDURE — 99232 SBSQ HOSP IP/OBS MODERATE 35: CPT | Mod: GC

## 2025-02-04 RX ORDER — QUETIAPINE FUMARATE 300 MG/1
100 TABLET ORAL AT BEDTIME
Refills: 0 | Status: DISCONTINUED | OUTPATIENT
Start: 2025-02-04 | End: 2025-02-06

## 2025-02-04 RX ADMIN — Medication 0.5 MILLIGRAM(S): at 07:03

## 2025-02-04 RX ADMIN — Medication 20 MILLIGRAM(S): at 09:01

## 2025-02-04 RX ADMIN — Medication 0.5 MILLIGRAM(S): at 14:05

## 2025-02-04 RX ADMIN — QUETIAPINE FUMARATE 100 MILLIGRAM(S): 300 TABLET ORAL at 20:58

## 2025-02-04 RX ADMIN — Medication 0.5 MILLIGRAM(S): at 20:58

## 2025-02-04 NOTE — BH TREATMENT PLAN - NSTXDCOPNOINTERSW_PSY_ALL_CORE
SW reviewed EMR, met with pt, gained connsets to speak wtih parents and outpt provider
SW reviewed EMR, met with pt, gained connsets to speak wtih parents and outpt provider

## 2025-02-04 NOTE — BH TREATMENT PLAN - NSTXDISORGINTERPR_PSY_ALL_CORE
Psychiatric Rehabilitation staff will continue to provide encouragement, support, psychotherapy, and psychoeducation to assist the Pt in the progression of his treatment goal and engagement with activities.
Psychiatric Rehabilitation staff will continue to provide encouragement, support, psychotherapy, and psychoeducation to assist the Pt in the progression of his treatment goal and engagement with activities.

## 2025-02-04 NOTE — BH INPATIENT PSYCHIATRY PROGRESS NOTE - NSBHASSESSSUMMFT_PSY_ALL_CORE
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 [place] [facility], domiciled with parents, no past medical history, with past charted psychiatric history of Borderline Personality Disorder, anxiety and panic attacks, MDD, past charted hx of "experimenting with agents (last semester took Adderall for about 1 week obtained from someone else)" past charted hx of "Cannabis use ~ 1x/week; ETOH use fluctuates from 2x/week to 5x/week; at most drank 10 drinks with blackouts; denies complicated withdrawals/DTs/seizures," previously in treatment at [place] 12/21/22-5/12/23 s/p being referred there s/p following a suicidal gesture of consuming ETOH and Klonopin, with past inpatient psychiatric admissions on 1S (9.13 direct admit from [facility] in 2024), does not currently see a psychiatrist but attends DBT therapy sessions with Jorje Swann ([phone]), his PCP prescribed Prozac 20mg, later reduced to 10mg, who was brought in by family from [facility] for change in behavior over the lats several days and then being non verbal starting earlier in the day. Patient was admitted from the ED to 1N on 9.39.    In Crisis Center there was concern for possible manic episode in the setting of restarting fluoxetine. In the ED, there were concerns for catatonia, psychosis. On evaluation on 1N, patient appears guarded and depressed, but does not exhibit signs or symptoms of cornel, psychosis, or catatonia. Patient notes recent breakup on January 20th which precipitated current episode. Has been having SI but is unwilling to discuss the details of these thoughts. Patient also unwilling to discuss trauma at this time. Differential diagnosis includes borderline personality disorder, major depressive disorder, bipolar disorder, substance-induced mood disorder, and PTSD. Per collateral with patient's family, he has done well on fluoxetine in the past and was not able to tolerate aripiprazole or bupropion. Will restart fluoxetine 20 mg daily and start quetiapine 50 mg nightly for antidepressant augmentation, mood stabilization, and insomnia.    2/4 Update  Patient less depressed, less guarded, and participating more in interview.  Does not appear to have significant catatonic symptoms.  Decrease lorazepam to 0.5 mg TID.  Will continue fluoxetine 20 mg daily and quetiapine 100 mg nightly.    1/31- pt was verbal on the unit, able to participate with interview  2/1- pt is selectively mute; visible, but no talking; lorazepam started  2/2 - pt improved, continue lorazepam; pt tolerating fluoxetine and quetiapine    Plan  Admit to  on 9.39, emergency.  Restart fluoxetine 20 mg daily as patient and his family feel that fluoxetine has helped him in the past.  Start quetiapine 50 mg nightly 1/31 and 100 mg 2/4 for mood given concerns for possible cornel and intolerance of low dose aripiprazole.  Lorazepam TID for concerns for catatonia -> low concern for catatonia on subsequent evaluations but patient's anxiety improving with lorazepam. Will begin taper with plans to complete taper while in partial program.  Lorazepam PO/IM PRNs for anxiety, agitation.  Individual, group, and milieu therapy.  Dispo likely to [facility] partial hospitalization program.

## 2025-02-04 NOTE — BH TREATMENT PLAN - NSTXDISORGGOAL_PSY_ALL_CORE
Will demonstrate related thoughts for 5 min in conversation
Will demonstrate related thoughts for 5 min in conversation

## 2025-02-04 NOTE — BH TREATMENT PLAN - NSDCCRITERIA_PSY_ALL_CORE
Patient will be less depressed and ready for discharge with a CGI improvement score of 2 or less.
Patient will be less depressed and ready for discharge with a CGI improvement score of 2 or less.

## 2025-02-04 NOTE — BH INPATIENT PSYCHIATRY PROGRESS NOTE - CURRENT MEDICATION
MEDICATIONS  (STANDING):  FLUoxetine 20 milliGRAM(s) Oral daily  influenza   Vaccine 0.5 milliLiter(s) IntraMuscular once  LORazepam     Tablet 0.5 milliGRAM(s) Oral three times a day  QUEtiapine 100 milliGRAM(s) Oral at bedtime    MEDICATIONS  (PRN):  hydrOXYzine hydrochloride 50 milliGRAM(s) Oral every 8 hours PRN Anxiety  LORazepam     Tablet 2 milliGRAM(s) Oral every 6 hours PRN anxiety 2/2 mood d/o  LORazepam   Injectable 2 milliGRAM(s) IntraMuscular once PRN anxiety 2/2 mood d/o

## 2025-02-05 PROCEDURE — 99239 HOSP IP/OBS DSCHRG MGMT >30: CPT

## 2025-02-05 RX ORDER — FLUOXETINE HCL 10 MG
1 CAPSULE ORAL
Qty: 30 | Refills: 0
Start: 2025-02-05 | End: 2025-03-06

## 2025-02-05 RX ORDER — QUETIAPINE FUMARATE 300 MG/1
1 TABLET ORAL
Qty: 30 | Refills: 0
Start: 2025-02-05 | End: 2025-03-06

## 2025-02-05 RX ADMIN — Medication 0.5 MILLIGRAM(S): at 20:07

## 2025-02-05 RX ADMIN — Medication 20 MILLIGRAM(S): at 09:17

## 2025-02-05 RX ADMIN — Medication 0.5 MILLIGRAM(S): at 09:17

## 2025-02-05 RX ADMIN — QUETIAPINE FUMARATE 100 MILLIGRAM(S): 300 TABLET ORAL at 20:07

## 2025-02-05 NOTE — BH INPATIENT PSYCHIATRY DISCHARGE NOTE - NSBHASSESSSUMMFT_PSY_ALL_CORE
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 [place] [facility], domiciled with parents, no past medical history, with past charted psychiatric history of Borderline Personality Disorder, anxiety and panic attacks, MDD, past charted hx of "experimenting with agents (last semester took Adderall for about 1 week obtained from someone else)" past charted hx of "Cannabis use ~ 1x/week; ETOH use fluctuates from 2x/week to 5x/week; at most drank 10 drinks with blackouts; denies complicated withdrawals/DTs/seizures," previously in treatment at [place] 12/21/22-5/12/23 s/p being referred there s/p following a suicidal gesture of consuming ETOH and Klonopin, with past inpatient psychiatric admissions on 1S (9.13 direct admit from [facility] in 2024), does not currently see a psychiatrist but attends DBT therapy sessions with Jorje Swann ([phone]), his PCP prescribed Prozac 20mg, later reduced to 10mg, who was brought in by family from [facility] for change in behavior over the lats several days and then being non verbal starting earlier in the day. Patient was admitted from the ED to 1N on 9.39.    In Crisis Center there was concern for possible manic episode in the setting of restarting fluoxetine. In the ED, there were concerns for catatonia, psychosis. On evaluation on 1N, patient appears guarded and depressed, but does not exhibit signs or symptoms of cornel, psychosis, or catatonia. Patient notes recent breakup on January 20th which precipitated current episode. Has been having SI but is unwilling to discuss the details of these thoughts. Patient also unwilling to discuss trauma at this time. Differential diagnosis includes borderline personality disorder, major depressive disorder, bipolar disorder, substance-induced mood disorder, and PTSD. Per collateral with patient's family, he has done well on fluoxetine in the past and was not able to tolerate aripiprazole or bupropion. Will restart fluoxetine 20 mg daily and start quetiapine 50 mg nightly for antidepressant augmentation, mood stabilization, and insomnia. Lorazepam was started by coverage attending on 2/1 due to concerns for catatonia.    Plan  Continue fluoxetine 20 mg daily as patient and his family feel that fluoxetine has helped him in the past.  Continue quetiapine 100 mg nightly for mood given concerns for possible cornel and intolerance of low dose aripiprazole.  Continue lorazepam 0.5 mg BID for anxiety, concerns for catatonia -> taper and discontinue in PHP.  Dispo to [facility] partial hospitalization program with intake on Friday (2/7). 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 [place] [facility], domiciled with parents, no past medical history, with past charted psychiatric history of Borderline Personality Disorder, anxiety and panic attacks, MDD, past charted hx of "experimenting with agents (last semester took Adderall for about 1 week obtained from someone else)" past charted hx of "Cannabis use ~ 1x/week; ETOH use fluctuates from 2x/week to 5x/week; at most drank 10 drinks with blackouts; denies complicated withdrawals/DTs/seizures," previously in treatment at [place] 12/21/22-5/12/23 s/p being referred there s/p following a suicidal gesture of consuming ETOH and Klonopin, with past inpatient psychiatric admissions on 1S (9.13 direct admit from [facility] in 2024), does not currently see a psychiatrist but attends DBT therapy sessions with Jorje Swann ([phone]), his PCP prescribed Prozac 20mg, later reduced to 10mg, who was brought in by family from [facility] for change in behavior over the lats several days and then being non verbal starting earlier in the day. Patient was admitted from the ED to 1N on 9.39.  In Crisis Center there was concern for possible manic episode in the setting of restarting fluoxetine. In the ED, there were concerns for catatonia, psychosis. On evaluation on 1N, patient appears guarded and depressed, but does not exhibit signs or symptoms of cornel, psychosis, or catatonia. Patient notes recent breakup on January 20th which precipitated current episode. Has been having SI but is unwilling to discuss the details of these thoughts. Patient also unwilling to discuss trauma at this time. Differential diagnosis includes borderline personality disorder, major depressive disorder, bipolar disorder, substance-induced mood disorder, and PTSD. Per collateral with patient's family, he has done well on fluoxetine in the past and was not able to tolerate aripiprazole or bupropion. Will restart fluoxetine 20 mg daily and start quetiapine 50 mg nightly for antidepressant augmentation, mood stabilization, and insomnia. Lorazepam was started by coverage attending on 2/1 due to concerns for catatonia.    2/6 Update  Patient has improved significantly and no longer with catatonic like symptoms and as per family at baseline  Patient will be discharged to home and follow up at [facility]  Suicide and risk assessment performed prior to discharge. The patient has a low acute risk and low chronic risk of self-harm and aggression towards others. Protective factors include denying SI, no SIB, denying HI, good social supports in their family, no substance abuse, no current mood symptoms, no hopelessness, future-oriented in returning to home, no access to firearms.  Risk factors include presenting illness. Immediate risk was minimized by inpatient admission to a safe environment with appropriate supervision and limited access to lethal means. Future risk was minimized before discharge by treatment of acute episode, maximizing outpatient support, providing relevant patient education, discussing emergency procedures, and ensuring close follow-up. The patient remains at a low risk of self-harm, and such risk cannot be further ameliorated by continued inpatient treatment and the patient is therefore appropriate for discharge.       Plan  Continue fluoxetine 20 mg daily as patient and his family feel that fluoxetine has helped him in the past.  Continue quetiapine 100 mg nightly for mood given concerns for possible cornel and intolerance of low dose aripiprazole.  Continue lorazepam 0.5 mg BID for anxiety, concerns for catatonia -> taper and discontinue in [facility].  Dispo to [facility] partial hospitalization program with intake on Friday (2/7).

## 2025-02-05 NOTE — BH DISCHARGE NOTE NURSING/SOCIAL WORK/PSYCH REHAB - NSDCPRRECOMMEND_PSY_ALL_CORE
Psychiatric Rehabilitation staff recommends that patient continue to explore and utilize coping strategies for improved symptom management and sustained recovery.

## 2025-02-05 NOTE — BH DISCHARGE NOTE NURSING/SOCIAL WORK/PSYCH REHAB - PATIENT PORTAL LINK FT
You can access the FollowMyHealth Patient Portal offered by [facility] by registering at the following website: [email or web address]. By joining [employer]’s FollowMyHealth portal, you will also be able to view your health information using other applications (apps) compatible with our system.

## 2025-02-05 NOTE — BH INPATIENT PSYCHIATRY DISCHARGE NOTE - NSDCCPCAREPLAN_GEN_ALL_CORE_FT
PRINCIPAL DISCHARGE DIAGNOSIS  Diagnosis: Unspecified mood [affective] disorder  Assessment and Plan of Treatment:       SECONDARY DISCHARGE DIAGNOSES  Diagnosis: Borderline personality disorder  Assessment and Plan of Treatment:     Diagnosis: Moderate alcohol use disorder, in early remission  Assessment and Plan of Treatment:      PRINCIPAL DISCHARGE DIAGNOSIS  Diagnosis: Unspecified mood [affective] disorder  Assessment and Plan of Treatment:       SECONDARY DISCHARGE DIAGNOSES  Diagnosis: Borderline personality disorder  Assessment and Plan of Treatment:     Diagnosis: Moderate alcohol use disorder, in early remission  Assessment and Plan of Treatment:     Diagnosis: Catatonia associated with another mental disorder  Assessment and Plan of Treatment:      PRINCIPAL DISCHARGE DIAGNOSIS  Diagnosis: Major depressive disorder  Assessment and Plan of Treatment:       SECONDARY DISCHARGE DIAGNOSES  Diagnosis: Borderline personality disorder  Assessment and Plan of Treatment:     Diagnosis: Moderate alcohol use disorder, in early remission  Assessment and Plan of Treatment:     Diagnosis: Catatonia associated with another mental disorder  Assessment and Plan of Treatment:

## 2025-02-05 NOTE — BH INPATIENT PSYCHIATRY PROGRESS NOTE - NSBHTIMEACTIVITIESPERFORMED_PSY_A_CORE
Time spent includes interviewing patient, reviewing the medical record, charting and discussion of patient at interdisciplinary team meeting and excluded teaching

## 2025-02-05 NOTE — BH INPATIENT PSYCHIATRY PROGRESS NOTE - NSBHFUPINTERVALHXFT_PSY_A_CORE
Patient seen follow-up for depression, SI, and concerns for catatonia.  Case discussed with multidisciplinary team.  Patient taking fluoxetine 20 mg, quetiapine 50 mg nightly, and lorazepam.  Patient with improvement in hypertension and tachycardia.    Patient notes continued improvement in his mood. No longer having SI. Feels tired and would like to further decrease the lorazepam dose. Excited to return home so that he can continue acting school and practice his DBT skills. Remains interested in partial program; intake scheduled for Friday.
Case discussed with nursing; pt has responded well to Ativan, wishes to stop  BP remains elevated but not critical.     Pt seen in the dining area. He is bright, calm, cooperative. He reports that yesterday he was not speaking b/c he was tired. He states that he had not slept the night before. With the Ativan received yesterday, pt says that he is better, slept well and feels good. Pt reports that he felt  disassociated yesterday, that he was not really here.   His mother came to visit and they had a good visit together. He denies any mood sxs or SI/HI/I/P. He is eating. Pt asked if he could come off the Ativan; advised to take HS dose tonight, and tomorrow can discuss with primary team if he should continue. 
Patient seen follow-up for depression, SI, and concerns for catatonia.  Case discussed with multidisciplinary team.  Patient taking fluoxetine 20 mg, quetiapine 50 mg nightly, and lorazepam 1 mg TID.  Patient remains with hypertension and tachycardia.    Patient sleeping well in hospital. Appetite good. Participating in groups and interacting with peers. Says that he gets "frozen up" during the interview and with his parents because he is anxious. Patient says that he wants to "get back to where I was". Says that he has been having difficulty getting jobs in acting which has strained his lead him to have difficulties sleeping and ruminating thoughts which strained his relationship with his ex-girlfriend and parents. Was having depressive symptoms end of December into early January prior to breaking up with his girlfriend on January 20th. Notes that he has been having "intrusive thoughts" about killing himself, but has not had plans or a desire to act on them. Feels that he was doing well previously on medications. Is nervous about lorazepam due to concerns for becoming dependent.
Pt was in his room when writer approached, pt was able to follow instructions to dress and come meet with writer for interview. Pt followed writer to day area. He sat there, not speaking, looking down. Pt would/could not answer any questions regarding how is doing, eating/sleeping/psychosis/cornel/SI.  Pt was holding piece of paper with his mother's number and he was asked to call her if he felt better as she wishes to speak with him.    Spoke with nursing staff re: vitals. Pt had episode of automomic instability earlier. Pt BP improves when seated. 
Patient seen follow-up for depression, SI, and concerns for catatonia.  Case discussed with multidisciplinary team.  Patient taking fluoxetine 20 mg, quetiapine 50 mg nightly, and lorazepam.  Patient with improvement in hypertension and tachycardia.    Patient with brighter affect today. He is less guarded and speaking more. Notes that December and January are hard months for him because this time of the year brings back memories of his previous psychiatric hospitalization. Feels that during the holidays, extended family members were looking at him differently because they think he is "crazy". Also feels that he was under significant pressure about his relationship; feels that he wasted last year because he spent all of his time cultivating his relationship and also feels guilty that he could not be a better partner for his ex-girlfriend that also has borderline personality disorder and experienced trauma as a child. Patient also feels purposeless in life because he has not been able to get a job since graduating from [employer]. He also experienced a recent death in the family. Despite all of these stressors, patient expresses that he is feeling much better now, and is looking forward to partial program and returning to DBT. Thinks that quetiapine and fluoxetine are helping him. Remains apprehensive about lorazepam, but does think that it is helping with his anxiety while inpatient.

## 2025-02-05 NOTE — BH INPATIENT PSYCHIATRY PROGRESS NOTE - NSBHATTESTCOMMENTATTENDFT_PSY_A_CORE
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 [place] [facility], domiciled with parents, no past medical history, with past charted psychiatric history of Borderline Personality Disorder, anxiety and panic attacks, MDD, past charted hx of "experimenting with agents (last semester took Adderall for about 1 week obtained from someone else)" past charted hx of "Cannabis use ~ 1x/week; ETOH use fluctuates from 2x/week to 5x/week; at most drank 10 drinks with blackouts; denies complicated withdrawals/DTs/seizures," previously in treatment at [facility] 12/21/22-5/12/23 s/p being referred there s/p following a suicidal gesture of consuming ETOH and Klonopin, with past inpatient psychiatric admissions on 1S (9.13 direct admit from [facility] in 2024), does not currently see a psychiatrist but attends DBT therapy sessions with Jorje Swann ([phone]), his PCP prescribed Prozac 20mg, later reduced to 10mg, who was brought in by family from [facility] for change in behavior over the lats several days and then being non verbal starting earlier in the day. Patient was admitted from the ED to 1N on 9.39    2/05 Update  Patient with continued clinical improvement today with lorazepam and quetiapine   Fluoxetine to 20 mg and increase Quetiapine to 100 mg HS and lorazepam 0.5 BID 
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 [place] [facility], domiciled with parents, no past medical history, with past charted psychiatric history of Borderline Personality Disorder, anxiety and panic attacks, MDD, past charted hx of "experimenting with agents (last semester took Adderall for about 1 week obtained from someone else)" past charted hx of "Cannabis use ~ 1x/week; ETOH use fluctuates from 2x/week to 5x/week; at most drank 10 drinks with blackouts; denies complicated withdrawals/DTs/seizures," previously in treatment at [place] 12/21/22-5/12/23 s/p being referred there s/p following a suicidal gesture of consuming ETOH and Klonopin, with past inpatient psychiatric admissions on 1S (9.13 direct admit from [facility] in 2024), does not currently see a psychiatrist but attends DBT therapy sessions with Jorje Swann ([phone]), his PCP prescribed Prozac 20mg, later reduced to 10mg, who was brought in by family from [facility] for change in behavior over the lats several days and then being non verbal starting earlier in the day. Patient was admitted from the ED to 1N on 9.39    2/3 Update  Patient appeared to be noncommunicative over the weekend and possibly catatonic  This morning with increased latency in his speech and anxiety but possibly some mild catatonic like features  Fluoxetine to 20 mg and increase Quetiapine to 100 mg HS and lorazepam 0.5/0.5/1 mg 
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 [place] [facility], domiciled with parents, no past medical history, with past charted psychiatric history of Borderline Personality Disorder, anxiety and panic attacks, MDD, past charted hx of "experimenting with agents (last semester took Adderall for about 1 week obtained from someone else)" past charted hx of "Cannabis use ~ 1x/week; ETOH use fluctuates from 2x/week to 5x/week; at most drank 10 drinks with blackouts; denies complicated withdrawals/DTs/seizures," previously in treatment at [place] 12/21/22-5/12/23 s/p being referred there s/p following a suicidal gesture of consuming ETOH and Klonopin, with past inpatient psychiatric admissions on 1S (9.13 direct admit from [facility] in 2024), does not currently see a psychiatrist but attends DBT therapy sessions with Jorje Swann ([phone]), his PCP prescribed Prozac 20mg, later reduced to 10mg, who was brought in by family from [facility] for change in behavior over the lats several days and then being non verbal starting earlier in the day. Patient was admitted from the ED to 1N on 9.39    2/04 Update  Patient with clinical improvement today with lorazepam and quetiapine   Fluoxetine to 20 mg and increase Quetiapine to 100 mg HS and lorazepam 0.5 TID

## 2025-02-05 NOTE — BH INPATIENT PSYCHIATRY DISCHARGE NOTE - HPI (INCLUDE ILLNESS QUALITY, SEVERITY, DURATION, TIMING, CONTEXT, MODIFYING FACTORS, ASSOCIATED SIGNS AND SYMPTOMS)
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 [place] [facility], domiciled with parents, no past medical history, with past charted psychiatric history of Borderline Personality Disorder, anxiety and panic attacks, MDD, past charted hx of "experimenting with agents (last semester took Adderall for about 1 week obtained from someone else)" past charted hx of "Cannabis use ~ 1x/week; ETOH use fluctuates from 2x/week to 5x/week; at most drank 10 drinks with blackouts; denies complicated withdrawals/DTs/seizures," previously in treatment at [facility] 12/21/22-5/12/23 s/p being referred there s/p following a suicidal gesture of consuming ETOH and Klonopin, with past inpatient psychiatric admissions on 1S (9.13 direct admit from [facility] in 2024), does not currently see a psychiatrist but attends DBT therapy sessions with Jorje Swann ([phone]), his PCP prescribed Prozac 20mg, later reduced to 10mg, who was brought in by family from [facility] for change in behavior over the last several days and then being non verbal starting earlier in the day. Patient was admitted from the ED to 1N on 9.39.    On evaluation on 1N, patient appears guarded and depressed, but does not exhibit signs or symptoms of cornel, psychosis, or catatonia. Patient states that he wishes he would have participated with the interviews in the Crisis Center and ED. Patient says that he was doing well taking fluoxetine and aripiprazole and participating in DBT outpatient program since discharge from [facility] 1S in January 2024. Patient began dating someone in June 2024, and decided that he was doing well enough to discontinue his medications in July 2024. Patients notes that he had a breakup on January 20th, 2025 which triggered his current episode. Patient's PCP tried to restart fluoxetine 20 mg, and then reduced it to 10 mg because he was not feeling well. Describes a "cornel-like" episode that last one day earlier this week. Patient has had depressed mood, anhedonia, poor sleep, ruminations and guilt, and suicidal ideation which he describes as "intrusive thoughts". Patient avoid discussions about the details of his suicidal ideation. Patient with a history of suicidal gesture by taking alcohol and clonazepam which lead to his admission to , but also does not want to discuss the details of this event. Patient also endorses NSSIB by skin picking. Patient says that he previously used alcohol and marijuana heavily, but has not been using them during the last few weeks. He is not having HI, AVH, or delusions.    Per collateral with the patient's parents. He had previously done well on fluoxetine until he had a suicide attempt. Aripiprazole 2 mg was added to his regimen, but he developed tremors and was decreased to 1 mg. He then had a trial of bupropion monotherapy, but became restless and was switched back to fluoxetine and aripiprazole 1 mg.    Per ED Psych Evaluation:  On evaluation, patient is alert, calm, cooperative, however, oddly related, with significantly speech latency, slow and soft speech, poverty of thought, poor eye contact and largely repeating self throughout interview.  When asked what brings patient to the emergency room, he states "my parents brought me here."  Writer asks why he thinks parents worried about him and he states "I was just like...." and does not finish sentence.  Writer prompts patient again, and he states "I was just picking at my skin."  Writer asks follow up open ended questions and patient states "yeah..." and just nodding head indiscriminately.  Patient continues to stare at the floor, looking away, and shaking his head.  Writer asks why he is shaking his head, and he states "um...yeah" "I was uh..."  Patient then states "I was going through a tough time."  Writer asks patient to clarify and patient states "I was like....yeah." Writer asks patient if he is feeling anxious as he appears to be and he states "I haven't really been myself lately."  Writer asks for clarification again and he states "im in shock right now." and clarified "I...uh spent like a while just not really taking care of myself." and clarified that he has not been sleeping but unable to answer any details about recent sleep patterns.  Reports mood has been "ok."  Denies depressed, sad or tearful mood, denies euphoria or irritability.  Denies SI but not able to take partake towards meaningful safety planning given mental status exam.  Patient denies alcohol, cigarette, drug use or taking medication not as Rxed or overdose on any pills / substances.   Denies AVH.  Denies HI. Interview is terminated as patient not able to engage in further questions and answers with latency and not finishing his sentences.     Brian Keenan: Patient speaks less than 20 words per 5 min (+2),and with some starting (+1), No other positive findings on scale.     Per [facility] Evaluation:  "A patient from [facility] is on his way to the ED for evaluation, Jimbo Romero 7/16[birth year], i am writing handoff now. possible manic episode with psychosis - patient is non-verbal with high BP & HR needing medical clearance   Patient is a 24 year old  male domiciled in private residence in [place], [place] with mother and father. Patient presents as non-verbal and internally preoccupied. Patient looking out the window and rocking back and forth in chair. Patient unable to make eye contact with others. Patient has a hx of two inpatient hospitalizations (2022 & January 2024) due to depressive symptoms. Most recent hospitalization due to suicide note left for family with plan.  Patient currently in treatment at [facility]. Patient was discharged in 2024 on Prozac 40mg and 1mg abilify. Parents report that patient stopped medication in August of 2024 due to feeling better, more positive, and continued with therapy. Parents report that patient broke up with his girlfriend in early january 2025. He began experiencing negative thoughts about self/situation. Patient re-started Prozac 20mg with PCP 2.5 weeks ago for approximately 1 week and reported "not feeling right" so PCP decreased to 10mg. Patient has been on 10mg. Parents report that on Wednesday morning at 5am, patient woke them up in their bedroom and appeared hyperverbal and happy to discuss his "plan for the future" with a completed vision board. He reported feeling "really great." Parents state that, at approximately 3pm that day, the patient began saying things like, "I have to keep moving" and would walk up and down the stairs for hours on end. He reported telling parents that took a bath but was wearing the same clothes that he wore prior to bath.   This morning, patient presented as catatonic and was just "standing there."    He has not spoken or ate today. SWer unable to complete assessment due to patient's presentation at this time. Mother and father do have healthcare proxy rights as well as HIPAA forms & they have paperwork with them.  Thank you, Please let me know if you have any other questions.   Maribeth Zavala Patient is a 24 year old  male domiciled in private residence in [place], [place] with mother and father. Patient presents as non-verbal and internally preoccupied. Patient looking out the window and rocking back and forth in chair. Patient unable to make eye contact with others. 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 [place] [facility], domiciled with parents, no past medical history, with past charted psychiatric history of Borderline Personality Disorder, anxiety and panic attacks, MDD, past charted hx of "experimenting with agents (last semester took Adderall for about 1 week obtained from someone else)" past charted hx of "Cannabis use ~ 1x/week; ETOH use fluctuates from 2x/week to 5x/week; at most drank 10 drinks with blackouts; denies complicated withdrawals/DTs/seizures," previously in treatment at [facility] 12/21/22-5/12/23 s/p being referred there s/p following a suicidal gesture of consuming ETOH and Klonopin, with past inpatient psychiatric admissions on 1S (9.13 direct admit from [facility] in 2024), does not currently see a psychiatrist but attends DBT therapy sessions with Jorje Swann ([phone]), his PCP prescribed Prozac 20mg, later reduced to 10mg, who was brought in by family from [facility] for change in behavior over the last several days and then being non verbal starting earlier in the day. Patient was admitted from the ED to 1N on 9.39.    On evaluation on 1N, patient appears guarded and depressed, but does not exhibit signs or symptoms of cornel, psychosis, or catatonia. Patient states that he wishes he would have participated with the interviews in the Crisis Center and ED. Patient says that he was doing well taking fluoxetine and aripiprazole and participating in DBT outpatient program since discharge from [facility] 1S in January 2024. Patient began dating someone in June 2024, and decided that he was doing well enough to discontinue his medications in July 2024. Patients notes that he had a breakup on January 20th, 2025 which triggered his current episode. Patient's PCP tried to restart fluoxetine 20 mg, and then reduced it to 10 mg because he was not feeling well. Describes a "cornel-like" episode that last one day earlier this week. Patient has had depressed mood, anhedonia, poor sleep, ruminations and guilt, and suicidal ideation which he describes as "intrusive thoughts". Patient avoid discussions about the details of his suicidal ideation. Patient with a history of suicidal gesture by taking alcohol and clonazepam which lead to his admission to , but also does not want to discuss the details of this event. Patient also endorses NSSIB by skin picking. Patient says that he previously used alcohol and marijuana heavily, but has not been using them during the last few weeks. He is not having HI, AVH, or delusions.    Per collateral with the patient's parents. He had previously done well on fluoxetine until he had a suicide attempt. Aripiprazole 2 mg was added to his regimen, but he developed tremors and was decreased to 1 mg. He then had a trial of bupropion monotherapy, but became restless and was switched back to fluoxetine and aripiprazole 1 mg.    Per ED Psych Evaluation:  On evaluation, patient is alert, calm, cooperative, however, oddly related, with significantly speech latency, slow and soft speech, poverty of thought, poor eye contact and largely repeating self throughout interview.  When asked what brings patient to the emergency room, he states "my parents brought me here."  Writer asks why he thinks parents worried about him and he states "I was just like...." and does not finish sentence.  Writer prompts patient again, and he states "I was just picking at my skin."  Writer asks follow up open ended questions and patient states "yeah..." and just nodding head indiscriminately.  Patient continues to stare at the floor, looking away, and shaking his head.  Writer asks why he is shaking his head, and he states "um...yeah" "I was uh..."  Patient then states "I was going through a tough time."  Writer asks patient to clarify and patient states "I was like....yeah." Writer asks patient if he is feeling anxious as he appears to be and he states "I haven't really been myself lately."  Writer asks for clarification again and he states "im in shock right now." and clarified "I...uh spent like a while just not really taking care of myself." and clarified that he has not been sleeping but unable to answer any details about recent sleep patterns.  Reports mood has been "ok."  Denies depressed, sad or tearful mood, denies euphoria or irritability.  Denies SI but not able to take partake towards meaningful safety planning given mental status exam.  Patient denies alcohol, cigarette, drug use or taking medication not as Rxed or overdose on any pills / substances.   Denies AVH.  Denies HI. Interview is terminated as patient not able to engage in further questions and answers with latency and not finishing his sentences.     Brian Keenan: Patient speaks less than 20 words per 5 min (+2),and with some starting (+1), No other positive findings on scale.     Per [facility] Evaluation:  "A patient from [facility] is on his way to the ED for evaluation, Jimbo Romero 7/16/02, i am writing handoff now. possible manic episode with psychosis - patient is non-verbal with high BP & HR needing medical clearance   Patient is a 24 year old  male domiciled in private residence in [place], [place] with mother and father. Patient presents as non-verbal and internally preoccupied. Patient looking out the window and rocking back and forth in chair. Patient unable to make eye contact with others. Patient has a hx of two inpatient hospitalizations (2022 & January 2024) due to depressive symptoms. Most recent hospitalization due to suicide note left for family with plan.  Patient currently in treatment at [facility]. Patient was discharged in 2024 on Prozac 40mg and 1mg abilify. Parents report that patient stopped medication in August of 2024 due to feeling better, more positive, and continued with therapy. Parents report that patient broke up with his girlfriend in early january 2025. He began experiencing negative thoughts about self/situation. Patient re-started Prozac 20mg with PCP 2.5 weeks ago for approximately 1 week and reported "not feeling right" so PCP decreased to 10mg. Patient has been on 10mg. Parents report that on Wednesday morning at 5am, patient woke them up in their bedroom and appeared hyperverbal and happy to discuss his "plan for the future" with a completed vision board. He reported feeling "really great." Parents state that, at approximately 3pm that day, the patient began saying things like, "I have to keep moving" and would walk up and down the stairs for hours on end. He reported telling parents that took a bath but was wearing the same clothes that he wore prior to bath.   This morning, patient presented as catatonic and was just "standing there."    He has not spoken or ate today. SWer unable to complete assessment due to patient's presentation at this time. Mother and father do have healthcare proxy rights as well as HIPAA forms & they have paperwork with them.  Thank you, Please let me know if you have any other questions.   Maribeth Zavala Patient is a 24 year old  male domiciled in private residence in [place], [place] with mother and father. Patient presents as non-verbal and internally preoccupied. Patient looking out the window and rocking back and forth in chair. Patient unable to make eye contact with others. Patient has a … Vital Signs in Crisis Center:  BP:172/101 P:115 WT:146 HT:5'11" TEMP: 98.3 mother gave him a 1mg xanax this morning as she thought he was "too anxious to speak" but that did not do anything to assist pt's presentation."

## 2025-02-05 NOTE — BH INPATIENT PSYCHIATRY PROGRESS NOTE - PRN MEDS
MEDICATIONS  (PRN):  hydrOXYzine hydrochloride 50 milliGRAM(s) Oral every 8 hours PRN Anxiety  LORazepam     Tablet 2 milliGRAM(s) Oral every 6 hours PRN anxiety 2/2 mood d/o  LORazepam   Injectable 2 milliGRAM(s) IntraMuscular once PRN anxiety 2/2 mood d/o  

## 2025-02-05 NOTE — BH INPATIENT PSYCHIATRY PROGRESS NOTE - NSDCCRITERIA_PSY_ALL_CORE
Patient will be less depressed and ready for discharge with a CGI improvement score of 2 or less.

## 2025-02-05 NOTE — BH INPATIENT PSYCHIATRY DISCHARGE NOTE - NSBHMETABOLIC_PSY_ALL_CORE_FT
BMI: BMI (kg/m2): 20.9 (01-30-25 @ 22:51)  HbA1c: A1C with Estimated Average Glucose Result: 4.8 % (01-30-25 @ 17:37)    Glucose:   BP: --Vital Signs Last 24 Hrs  T(C): 36.6 (02-05-25 @ 08:18), Max: 36.6 (02-05-25 @ 08:18)  T(F): 97.9 (02-05-25 @ 08:18), Max: 97.9 (02-05-25 @ 08:18)  HR: --  BP: --  BP(mean): --  RR: --  SpO2: --    Orthostatic VS  02-05-25 @ 08:18  Lying BP: --/-- HR: --  Sitting BP: 138/88 HR: 84  Standing BP: 125/93 HR: 109  Site: --  Mode: --  Orthostatic VS  02-04-25 @ 07:31  Lying BP: --/-- HR: --  Sitting BP: 145/91 HR: 90  Standing BP: 135/91 HR: 109  Site: --  Mode: --    Lipid Panel: Date/Time: 01-30-25 @ 17:37  Cholesterol, Serum: 163  LDL Cholesterol Calculated: 97  HDL Cholesterol, Serum: 53  Total Cholesterol/HDL Ration Measurement: --  Triglycerides, Serum: 63

## 2025-02-05 NOTE — BH INPATIENT PSYCHIATRY DISCHARGE NOTE - HOSPITAL COURSE
Dates of Hospitalization: 1/30/2025 to 2/6/2025    Hospital Course:  On presentation to [facility], patient was non-verbal and family noted recent sleep disturbances, elevated mood, and odd behavior concerning for cornel/psychosis. Patient was transferred to the [facility] ED where he was oddly related with significant speech latency, slow and soft speech, poverty of thought, and poor eye contact leading to concerns for catatonia. Given presenting symptoms and context surrounding admit, patient was admitted to  on 9.39.    On evaluation on , patient appeared guarded and depressed, but did not exhibit signs or symptoms of cornel, psychosis, or catatonia. Patient stated that he wished he would have participated with the interviews in the Crisis Center and ED, but that he was nervous. Patient noted that he was doing well taking fluoxetine and aripiprazole and participating in DBT outpatient program after discharge from [facility] in January 2024. He discontinued medications in July 2024 after starting a new romantic relationship, and began decompensating in December 2024 as the relationship deteriorated, culminating in a breakup on 1/20/2025. He had a "cornel-like" episode that lasted one day in the week preceding admission, but primarily had depressed mood, anhedonia, poor sleep, ruminations and guilt, and suicidal ideation which he described as "intrusive thoughts" but would not discuss in detail.    Risk benefit of medications was discussed, discussion regarding risks included but was not limited to NMS, TD, weight change, sexual dysfunction, metabolic disturbances, and conduction abnormalities. The patient was in agreement with plan to start fluoxetine and quetiapine, which were titrated to 20 mg daily and 100 mg nightly, respectively. Fluoxetine was chosen for mood symptoms because the patient and his parents report efficacy in the past. Quetiapine was chosen for mood and insomnia given concerns for cornel/psychosis in the [facility] and patient's parents reporting previous intolerance of low-dose aripiprazole due to tremors and akathisia.    On 2/1/2025, the weekend coverage attending noted return of catatonia-like symptoms and started TID lorazepam. On 2/3/2025, patient was no longer exhibiting signs or symptoms of catatonia on examination by the primary team and was increasingly participatory in interview and open about the events leading to his hospitalization. Patient and his family expressed hesitancy about lorazepam given patient's history of substance use disorder. Given low concern for catatonia, lorazepam was tapered to 0.5 mg BID at discharge. Lorazepam should be tapered and discontinued while in [facility] partial hospitalization program.    Over the course of hospitalization the patient's depressive symptoms continued to improve and he remained without signs and symptoms of catatonia. On the day of dc the patient was no longer expressing SI or intrusive thoughts. At the time of dc the patient and family were engaged in safety planning and denied safety concerns related to dispo, they verbalized willingness to call 911 or go to ER in the event that patient was felt to be a threat to self or others. Patient discharged to home with intake for [facility] partial hospitalization program scheduled for Friday (2/7).    Risk Assessment:   The patient is at elevated chronic risk for suicide/self harm/violence, but low acute risk for suicide/self harm/violence. Static risk factors include; psychiatric illness dx of borderline personality disorder and major depressive disorder, hx of hospitalizations, history of SA by overdose, hx of substance use. Acute risk factors present on admission but mitigated during hospitalization include; depressed mood, recent breakup, non-adherence with medications. Acute risk factors were mitigated during admission via medication tx with fluoxetine, quetiapine, and lorazepam, I/G/M therapy, safety planning, and psychoeducation. 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 Given protective factors, and that acute risk factors present on admission have been addressed and are no longer present at [facility], patient has returned to baseline level of acute risk and the patient no longer requires inpatient hospitalization for stabilization or safety. The patient is therefore appropriate for dc to outpatient care. Chronic risk can be further mitigated by continued engagement with outpatient tx in [facility] partial hospitalization program, medication adherence, and return to [facility] following discharge from [facility]. At the time of dc the patient engaged meaningfully in safety planning and was dc home to outpatient tx.     Discharge Diagnosis:   Major depressive disorder  Borderline personality disorder  Moderate alcohol use disorder, in early remission  Concerns for catatonia associated with another mental disorder    Discharge Medications (the patient was provided with a 14 day supply of psychiatric medications upon dc):   FLUoxetine 20 mg oral capsule: 1 cap(s) orally once a day  hydrOXYzine hydrochloride 50 mg oral tablet: 1 tab(s) orally every 8 hours as needed for Anxiety or Insomnia  LORazepam 0.5 mg oral tablet: 1 tab(s) orally 2 times a day MDD: 1  QUEtiapine 100 mg oral tablet: 1 tab(s) orally once a day (at bedtime)

## 2025-02-05 NOTE — BH INPATIENT PSYCHIATRY PROGRESS NOTE - NSTXDISORGGOAL_PSY_ALL_CORE
Will demonstrate related thoughts for 5 min in conversation

## 2025-02-05 NOTE — BH INPATIENT PSYCHIATRY DISCHARGE NOTE - REASON FOR ADMISSION
You were brought to the [facility] by your parents and transferred to the ED because of suicidal ideation and behavioral changes, including poor sleep and not speaking.

## 2025-02-05 NOTE — BH INPATIENT PSYCHIATRY DISCHARGE NOTE - NSBHFUPINTERVALHXFT_PSY_A_CORE
Patient seen follow-up for depression, SI, and concerns for catatonia.  Case discussed with multidisciplinary team.  Patient taking fluoxetine 20 mg, quetiapine 50 mg nightly, and lorazepam 0.5 mg BID.  Patient notes continued improvement in his mood. No longer having SI. Feels tired, but otherwise good.   Discussed importance of structure, sleep, exercise, maintaining a balanced diet, adherence to medications and appointments, and avoiding substances.  Intake at [facility] partial hospitalization program tomorrow (2/7).

## 2025-02-05 NOTE — BH DISCHARGE NOTE NURSING/SOCIAL WORK/PSYCH REHAB - DISCHARGE INSTRUCTIONS AFTERCARE APPOINTMENTS
In order to check the location, date, or time of your aftercare appointment, please refer to your Discharge Instructions Document given to you upon leaving the hospital.  If you have lost the instructions please call [phone]

## 2025-02-05 NOTE — BH INPATIENT PSYCHIATRY DISCHARGE NOTE - ATTENDING DISCHARGE PHYSICAL EXAMINATION:
Patient seen individually for discharge day management.  Greater than 30 minutes spent with patient and charting as part of discharge day management.    I was physically present during the service to the patient and personally examined the patient and I was directly involved in the management plan and recommendations of the care provided to the patient.   I have reviewed the resident's discharge summary and agree with its contents and I have made changes as indicated.  Patient’s admission history and course of treatment as above     On discharge exam today the patient is generally cooperative and makes fair eye contact.   Speech is clear and of normal rate.  Thought process: with no disorder of thought process.   Thought content: with no evidence of delusional beliefs.   Perception: Denies hallucinations.  Mood: Describes as "improved"   Affect: flat.  Patient denies suicidal and aggressive ideation, intent and plan.   AAO X3. Cognitively grossly intact.   Insight and judgment are improved. Impulse control is intact at this time    Suicide and risk assessment performed prior to discharge. The patient has a low acute risk and low chronic risk of self-harm and aggression towards others. Protective factors include denying SI, no SIB, denying HI, good social supports in their family, no substance abuse, no current mood symptoms, no hopelessness, future-oriented in returning to home, no access to firearms.  Risk factors include presenting illness. Immediate risk was minimized by inpatient admission to a safe environment with appropriate supervision and limited access to lethal means. Future risk was minimized before discharge by treatment of acute episode, maximizing outpatient support, providing relevant patient education, discussing emergency procedures, and ensuring close follow-up. The patient remains at a low risk of self-harm, and such risk cannot be further ameliorated by continued inpatient treatment and the patient is therefore appropriate for discharge.       There were no behavioral problems on the unit.  Patient did not become agitated and did not require emergent intramuscular medications or seclusion / restraints.  Patient did not self-harm on the unit.  Patient remained actively engaged in treatment.  Patient participated in individual, group, and milieu therapy.  Patient got along appropriately with staff and peers.  Patient did not have any medical problems during this hospitalization.  There were no medical consultations.    A full discussion of the factors that predict treatment success and relapse was held including safety planning.  A discussion of the risks and benefits of patient’s medication was held including a discussion of the metabolic risks and risk of EPS and TD was done and AIMS Scale was performed and Score =0  On the day of discharge, the patient no longer requires inpatient treatment and care. Patient denies all suicidal and aggressive ideation, intent and plan. Patient denies anxiety symptoms and panic attacks. Patient is not judged to be an acute danger to self or others at this time. Patient will be discharged to home and outpatient follow up at [facility].

## 2025-02-05 NOTE — BH INPATIENT PSYCHIATRY DISCHARGE NOTE - OTHER PAST PSYCHIATRIC HISTORY (INCLUDE DETAILS REGARDING ONSET, COURSE OF ILLNESS, INPATIENT/OUTPATIENT TREATMENT)
Pt is a 23 yo male with 2 previous [facility] hospitalization in 2022 and 2024. Pt attended [facility] in the past. Pt has been seeing therapist at [facility], has not been taking medication the past few weeks. Pt has history of depression, Borderline PD, panic. Pt was BIB parents due to not speaking at home. Pt reports he has stopped smoking marijuana and drinking alcohol.

## 2025-02-05 NOTE — BH DISCHARGE NOTE NURSING/SOCIAL WORK/PSYCH REHAB - NSDCPRGOAL_PSY_ALL_CORE
Patient met psychiatric rehabilitation goal of demonstrating related thoughts for 5 min in conversation. Patient attended some psychiatric rehabilitation groups and was engaged appropriately during sessions. Patient was able to engage in safety planning prior to discharge.

## 2025-02-05 NOTE — BH INPATIENT PSYCHIATRY PROGRESS NOTE - NSBHMETABOLIC_PSY_ALL_CORE_FT
BMI: BMI (kg/m2): 20.9 (01-30-25 @ 22:51)  HbA1c: A1C with Estimated Average Glucose Result: 4.8 % (01-30-25 @ 17:37)    Glucose:   BP: 146/91 (02-02-25 @ 10:05) (146/91 - 146/91)Vital Signs Last 24 Hrs  T(C): 36.8 (02-03-25 @ 08:17), Max: 36.8 (02-03-25 @ 08:17)  T(F): 98.2 (02-03-25 @ 08:17), Max: 98.2 (02-03-25 @ 08:17)  HR: --  BP: --  BP(mean): --  RR: --  SpO2: --    Orthostatic VS  02-03-25 @ 08:17  Lying BP: --/-- HR: --  Sitting BP: 145/97 HR: 102  Standing BP: 142/98 HR: 112  Site: upper left arm  Mode: electronic    Lipid Panel: Date/Time: 01-30-25 @ 17:37  Cholesterol, Serum: 163  LDL Cholesterol Calculated: 97  HDL Cholesterol, Serum: 53  Total Cholesterol/HDL Ration Measurement: --  Triglycerides, Serum: 63  
BMI: BMI (kg/m2): 20.9 (01-30-25 @ 22:51)  HbA1c: A1C with Estimated Average Glucose Result: 4.8 % (01-30-25 @ 17:37)    Glucose:   BP: 146/91 (02-02-25 @ 10:05) (146/91 - 146/91)Vital Signs Last 24 Hrs  T(C): 32.2 (02-04-25 @ 07:31), Max: 32.2 (02-04-25 @ 07:31)  T(F): 90 (02-04-25 @ 07:31), Max: 90 (02-04-25 @ 07:31)  HR: --  BP: --  BP(mean): --  RR: --  SpO2: --    Orthostatic VS  02-04-25 @ 07:31  Lying BP: --/-- HR: --  Sitting BP: 145/91 HR: 90  Standing BP: 135/91 HR: 109  Site: --  Mode: --  Orthostatic VS  02-03-25 @ 08:17  Lying BP: --/-- HR: --  Sitting BP: 145/97 HR: 102  Standing BP: 142/98 HR: 112  Site: upper left arm  Mode: electronic    Lipid Panel: Date/Time: 01-30-25 @ 17:37  Cholesterol, Serum: 163  LDL Cholesterol Calculated: 97  HDL Cholesterol, Serum: 53  Total Cholesterol/HDL Ration Measurement: --  Triglycerides, Serum: 63  
BMI: BMI (kg/m2): 20.9 (01-30-25 @ 22:51)  HbA1c: A1C with Estimated Average Glucose Result: 4.8 % (01-30-25 @ 17:37)    Glucose:   BP: 146/91 (02-02-25 @ 10:05) (128/91 - 171/93)Vital Signs Last 24 Hrs  T(C): 36.9 (02-02-25 @ 10:05), Max: 36.9 (02-02-25 @ 10:05)  T(F): 98.4 (02-02-25 @ 10:05), Max: 98.4 (02-02-25 @ 10:05)  HR: 99 (02-02-25 @ 10:05) (99 - 99)  BP: 146/91 (02-02-25 @ 10:05) (146/91 - 146/91)  BP(mean): --  RR: --  SpO2: --    Orthostatic VS  02-01-25 @ 08:50  Lying BP: --/-- HR: --  Sitting BP: 150/82 HR: 104  Standing BP: --/-- HR: --  Site: --  Mode: --    Lipid Panel: Date/Time: 01-30-25 @ 17:37  Cholesterol, Serum: 163  LDL Cholesterol Calculated: 97  HDL Cholesterol, Serum: 53  Total Cholesterol/HDL Ration Measurement: --  Triglycerides, Serum: 63  
BMI: BMI (kg/m2): 20.9 (01-30-25 @ 22:51)  HbA1c: A1C with Estimated Average Glucose Result: 4.8 % (01-30-25 @ 17:37)    Glucose:   BP: 128/91 (01-31-25 @ 08:44) (128/91 - 171/93)Vital Signs Last 24 Hrs  T(C): 37.1 (02-01-25 @ 08:50), Max: 37.1 (02-01-25 @ 08:50)  T(F): 98.7 (02-01-25 @ 08:50), Max: 98.7 (02-01-25 @ 08:50)  HR: --  BP: --  BP(mean): --  RR: --  SpO2: --    Orthostatic VS  01-30-25 @ 22:51  Lying BP: --/-- HR: --  Sitting BP: 150/89 HR: 108  Standing BP: 137/88 HR: 110  Site: --  Mode: --    Lipid Panel: Date/Time: 01-30-25 @ 17:37  Cholesterol, Serum: 163  LDL Cholesterol Calculated: 97  HDL Cholesterol, Serum: 53  Total Cholesterol/HDL Ration Measurement: --  Triglycerides, Serum: 63  
BMI: BMI (kg/m2): 20.9 (01-30-25 @ 22:51)  HbA1c: A1C with Estimated Average Glucose Result: 4.8 % (01-30-25 @ 17:37)    Glucose:   BP: --Vital Signs Last 24 Hrs  T(C): 36.6 (02-05-25 @ 08:18), Max: 36.6 (02-05-25 @ 08:18)  T(F): 97.9 (02-05-25 @ 08:18), Max: 97.9 (02-05-25 @ 08:18)  HR: --  BP: --  BP(mean): --  RR: --  SpO2: --    Orthostatic VS  02-05-25 @ 08:18  Lying BP: --/-- HR: --  Sitting BP: 138/88 HR: 84  Standing BP: 125/93 HR: 109  Site: --  Mode: --  Orthostatic VS  02-04-25 @ 07:31  Lying BP: --/-- HR: --  Sitting BP: 145/91 HR: 90  Standing BP: 135/91 HR: 109  Site: --  Mode: --    Lipid Panel: Date/Time: 01-30-25 @ 17:37  Cholesterol, Serum: 163  LDL Cholesterol Calculated: 97  HDL Cholesterol, Serum: 53  Total Cholesterol/HDL Ration Measurement: --  Triglycerides, Serum: 63

## 2025-02-05 NOTE — BH INPATIENT PSYCHIATRY DISCHARGE NOTE - NSDCMRMEDTOKEN_GEN_ALL_CORE_FT
FLUoxetine 20 mg oral capsule: 1 cap(s) orally once a day  hydrOXYzine hydrochloride 50 mg oral tablet: 1 tab(s) orally every 8 hours as needed for Anxiety or Insomnia  LORazepam 0.5 mg oral tablet: 1 tab(s) orally 2 times a day MDD: 1  QUEtiapine 100 mg oral tablet: 1 tab(s) orally once a day (at bedtime)

## 2025-02-05 NOTE — BH INPATIENT PSYCHIATRY DISCHARGE NOTE - DESCRIPTION
Patient lives with his parents. 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 Does not currently work. Was dating someone from June 2024 until January 20th, 2025 which preceded the current episode.

## 2025-02-05 NOTE — BH INPATIENT PSYCHIATRY PROGRESS NOTE - NSICDXBHTERTIARYDX_PSY_ALL_CORE
R/O Catatonia   F06.1  
R/O Bipolar disorder   F31.9  R/O Post traumatic stress disorder (PTSD)   F43.10  R/O Major depressive disorder   F32.9  R/O Borderline personality disorder   F60.3  
R/O Bipolar disorder   F31.9  R/O Post traumatic stress disorder (PTSD)   F43.10  R/O Major depressive disorder   F32.9  R/O Borderline personality disorder   F60.3  
R/O Catatonia   F06.1  
R/O Bipolar disorder   F31.9  R/O Post traumatic stress disorder (PTSD)   F43.10  R/O Major depressive disorder   F32.9  R/O Borderline personality disorder   F60.3

## 2025-02-05 NOTE — BH INPATIENT PSYCHIATRY PROGRESS NOTE - NSICDXBHSECONDARYDX_PSY_ALL_CORE
Alcohol use disorder, moderate, in early remission   F10.21  

## 2025-02-05 NOTE — BH INPATIENT PSYCHIATRY PROGRESS NOTE - CURRENT MEDICATION
MEDICATIONS  (STANDING):  FLUoxetine 20 milliGRAM(s) Oral daily  influenza   Vaccine 0.5 milliLiter(s) IntraMuscular once  LORazepam     Tablet 0.5 milliGRAM(s) Oral two times a day  QUEtiapine 100 milliGRAM(s) Oral at bedtime    MEDICATIONS  (PRN):  hydrOXYzine hydrochloride 50 milliGRAM(s) Oral every 8 hours PRN Anxiety  LORazepam     Tablet 2 milliGRAM(s) Oral every 6 hours PRN anxiety 2/2 mood d/o  LORazepam   Injectable 2 milliGRAM(s) IntraMuscular once PRN anxiety 2/2 mood d/o   MEDICATIONS  (STANDING):  FLUoxetine 20 milliGRAM(s) Oral daily  LORazepam     Tablet 0.5 milliGRAM(s) Oral two times a day  QUEtiapine 100 milliGRAM(s) Oral at bedtime    MEDICATIONS  (PRN):  hydrOXYzine hydrochloride 50 milliGRAM(s) Oral every 8 hours PRN Anxiety  LORazepam     Tablet 2 milliGRAM(s) Oral every 6 hours PRN anxiety 2/2 mood d/o  LORazepam   Injectable 2 milliGRAM(s) IntraMuscular once PRN anxiety 2/2 mood d/o

## 2025-02-05 NOTE — BH INPATIENT PSYCHIATRY PROGRESS NOTE - NSBHASSESSSUMMFT_PSY_ALL_CORE
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[facility] [employer], domiciled with parents, no past medical history, with past charted psychiatric history of Borderline Personality Disorder, anxiety and panic attacks, MDD, past charted hx of "experimenting with agents (last semester took Adderall for about 1 week obtained from someone else)" past charted hx of "Cannabis use ~ 1x/week; ETOH use fluctuates from 2x/week to 5x/week; at most drank 10 drinks with blackouts; denies complicated withdrawals/DTs/seizures," previously in treatment at [facility] 12/21/22-5/12/23 s/p being referred there s/p following a suicidal gesture of consuming ETOH and Klonopin, with past inpatient psychiatric admissions on 1S (9.13 direct admit from [facility] in 2024), does not currently see a psychiatrist but attends DBT therapy sessions with Jorje Swann ([phone]), his PCP prescribed Prozac 20mg, later reduced to 10mg, who was brought in by family from [facility] for change in behavior over the lats several days and then being non verbal starting earlier in the day. Patient was admitted from the ED to 1N on 9.39.    In Crisis Center there was concern for possible manic episode in the setting of restarting fluoxetine. In the ED, there were concerns for catatonia, psychosis. On evaluation on 1N, patient appears guarded and depressed, but does not exhibit signs or symptoms of cornel, psychosis, or catatonia. Patient notes recent breakup on January 20th which precipitated current episode. Has been having SI but is unwilling to discuss the details of these thoughts. Patient also unwilling to discuss trauma at this time. Differential diagnosis includes borderline personality disorder, major depressive disorder, bipolar disorder, substance-induced mood disorder, and PTSD. Per collateral with patient's family, he has done well on fluoxetine in the past and was not able to tolerate aripiprazole or bupropion. Will restart fluoxetine 20 mg daily and start quetiapine 50 mg nightly for antidepressant augmentation, mood stabilization, and insomnia.    2/5 Update  Patient less depressed, less guarded, and participating more in interview.  Does not appear to have significant catatonic symptoms.  Decrease lorazepam to 0.5 mg BID.  Will continue fluoxetine 20 mg daily and quetiapine 100 mg nightly.    Plan  Admit to  on 9.39, emergency.  Restart fluoxetine 20 mg daily as patient and his family feel that fluoxetine has helped him in the past.  Start quetiapine 50 mg nightly 1/31 and 100 mg 2/4 for mood given concerns for possible cornel and intolerance of low dose aripiprazole.  Lorazepam TID for concerns for catatonia -> low concern for catatonia on subsequent evaluations but patient's anxiety improving with lorazepam. Will begin taper with plans to complete taper while in partial program.  Lorazepam PO/IM PRNs for anxiety, agitation.  Individual, group, and milieu therapy.  Dispo to [facility] partial hospitalization program with intake on Friday (2/7).

## 2025-02-05 NOTE — BH INPATIENT PSYCHIATRY PROGRESS NOTE - MSE UNSTRUCTURED FT
Patient is depressed and guarded with poor eye contact.  Appearance: Dressed in hospital scrubs. improvied hygiene.  Motor: PMR improving  Gait/station: Ambulating without issue.  Speech: soft, not rapid or pressured, nml tone  Mood: I feel better  Affect:constricted  Thought Process: linear, no derailments  Thought Content: denies A/V T H or delusios; denies SI/HI/I/P  Fund of knowledge intaci  Insight and judgement fair but limited  
Patient is depressed and guarded with poor eye contact.  Appearance: Dressed in hospital scrubs. Fair hygiene.  Motor: PMR, walks slow  Gait/station: Ambulating without issue.  Speech: non-verbal  Mood: UTO  Affect: UTO  Thought Process: UTO  Thought Content: UTO  Patient not having UTO  Fund of knowledge UTO  Insight and judgement   
Patient remains depressed but is more talkative and less guarded.  Appearance: Dressed in hospital scrubs. Good hygiene.  Motor: No PMR/PMA.  Gait/station: Ambulating without issue.  Speech: Normal rate, volume.  Mood: "I'm feeling good."  Affect: Depressed but improving.  Thought Process: Linear, goal directed  Thought Content: Patient not currently with SI, but reports intrusive thoughts about suicide prior to hospitalization. No HI. No delusions.   Patient not having AVH at this time.  Fund of knowledge fair.  Insight and judgement are improving. Impulse control is fair at this time.
Patient is depressed and guarded with poor eye contact.  Appearance: Dressed in hospital scrubs. Fair hygiene.  Motor: Patient fidgety, bouncing leg during interview. No PMR.  Gait/station: Ambulating without issue.  Speech: Slow and quiet in AM. On subsequent interview in afternoon, normal rate, volume.  Mood: "I'm feeling good."  Affect: Depressed  Thought Process: Linear, goal directed  Thought Content: Patient not currently with SI, but reports intrusive thoughts about suicide prior to hospitalization. No HI. No delusions.   Patient not having AVH at this time.  Fund of knowledge fair.  Insight and judgement are limited. Impulse control is fair at this time.
Patient remains depressed but is more talkative and less guarded.  Appearance: Dressed in hospital scrubs. Good hygiene.  Motor: No PMR/PMA.  Gait/station: Ambulating without issue.  Speech: Normal rate, volume.  Mood: "I'm doing better"  Affect: Depressed but improving.  Thought Process: Linear, goal directed  Thought Content: Patient not currently with SI, but reports intrusive thoughts about suicide prior to hospitalization. No HI. No delusions.   Patient not having AVH at this time.  Fund of knowledge fair.  Insight and judgement are improving. Impulse control is fair at this time.

## 2025-02-05 NOTE — BH INPATIENT PSYCHIATRY PROGRESS NOTE - NSBHCHARTREVIEWLAB_PSY_A_CORE FT
LABS:                          14.9   14.03 )-----------( 397      ( 30 Jan 2025 17:37 )             42.7     01-30    144  |  104  |  16  ----------------------------<  99  3.3[L]   |  21[L]  |  0.91    Ca    10.5      30 Jan 2025 17:37    TPro  8.2  /  Alb  5.2[H]  /  TBili  0.7  /  DBili  x   /  AST  19  /  ALT  14  /  AlkPhos  69  01-30    LIVER FUNCTIONS - ( 30 Jan 2025 17:37 )  Alb: 5.2 g/dL / Pro: 8.2 g/dL / ALK PHOS: 69 U/L / ALT: 14 U/L / AST: 19 U/L / GGT: x             Urinalysis Basic - ( 30 Jan 2025 17:37 )    Color: x / Appearance: x / SG: x / pH: x  Gluc: 99 mg/dL / Ketone: x  / Bili: x / Urobili: x   Blood: x / Protein: x / Nitrite: x   Leuk Esterase: x / RBC: x / WBC x   Sq Epi: x / Non Sq Epi: x / Bacteria: x

## 2025-02-05 NOTE — BH DISCHARGE NOTE NURSING/SOCIAL WORK/PSYCH REHAB - NSPROEXTENSIONSOFSELF_GEN_A_NUR
Chief Complaint   Patient presents with     Consult     SOB    Medications reviewed and vital signs taken.   Jose Tadeo CMA    
eyeglasses

## 2025-02-05 NOTE — BH INPATIENT PSYCHIATRY PROGRESS NOTE - NSBHFUPINTERVALCCFT_PSY_A_CORE
"I'm feeling good."
elevated  BP, this am not talking
"I'm doing better."
"I'm feeling good."
elevated  BP

## 2025-02-05 NOTE — BH INPATIENT PSYCHIATRY DISCHARGE NOTE - DETAILS
Patient guarded. Will continue to assess as patient becomes more comfortable with the treatment team.

## 2025-02-05 NOTE — BH INPATIENT PSYCHIATRY DISCHARGE NOTE - MSE UNSTRUCTURED FT
Patient with continued improvement in his depression.  Appearance: Dressed in hospital scrubs. Good hygiene.  Motor: No PMR/PMA.  Gait/station: Ambulating without issue.  Speech: Normal rate, volume.  Mood: "Excited to go home."  Affect: Depressed but improving.  Thought Process: Linear, goal directed  Thought Content: Patient not currently with SI, but reports intrusive thoughts about suicide prior to hospitalization. No HI. No delusions.   Patient not having AVH at this time.  Fund of knowledge fair.  Insight and judgement are fair. Impulse control is fair at this time.

## 2025-02-05 NOTE — BH DISCHARGE NOTE NURSING/SOCIAL WORK/PSYCH REHAB - NSCDUDCCRISIS_PSY_A_CORE
[facility]  1 [phone]) [facility]-WELL ([phone])  Text "WELL" to 26694  Website: www.[employer]/.[employer] [facility] 1 (240) 621-FBXA (9233) Website: www.safehorizon.org/.National Suicide Prevention Lifeline 2 (214) 759-4309/.  [employer]  1 (593) LIFENET (603-0730)/.  [facility]  [phone] [place] [phone]   Hours:  Monday through Friday from 9 AM to 3 PM/988 Suicide and Crisis Lifeline

## 2025-02-05 NOTE — BH INPATIENT PSYCHIATRY PROGRESS NOTE - NSBHCHARTREVIEWVS_PSY_A_CORE FT
Vital Signs Last 24 Hrs  T(C): 32.2 (02-04-25 @ 07:31), Max: 32.2 (02-04-25 @ 07:31)  T(F): 90 (02-04-25 @ 07:31), Max: 90 (02-04-25 @ 07:31)  HR: --  BP: --  BP(mean): --  RR: --  SpO2: --    Orthostatic VS  02-04-25 @ 07:31  Lying BP: --/-- HR: --  Sitting BP: 145/91 HR: 90  Standing BP: 135/91 HR: 109  Site: --  Mode: --  Orthostatic VS  02-03-25 @ 08:17  Lying BP: --/-- HR: --  Sitting BP: 145/97 HR: 102  Standing BP: 142/98 HR: 112  Site: upper left arm  Mode: electronic  
Vital Signs Last 24 Hrs  T(C): 36.8 (02-03-25 @ 08:17), Max: 36.8 (02-03-25 @ 08:17)  T(F): 98.2 (02-03-25 @ 08:17), Max: 98.2 (02-03-25 @ 08:17)  HR: --  BP: --  BP(mean): --  RR: --  SpO2: --    Orthostatic VS  02-03-25 @ 08:17  Lying BP: --/-- HR: --  Sitting BP: 145/97 HR: 102  Standing BP: 142/98 HR: 112  Site: upper left arm  Mode: electronic  
Vital Signs Last 24 Hrs  T(C): 37.1 (02-01-25 @ 08:50), Max: 37.1 (02-01-25 @ 08:50)  T(F): 98.7 (02-01-25 @ 08:50), Max: 98.7 (02-01-25 @ 08:50)  HR: --  BP: --  BP(mean): --  RR: --  SpO2: --    Orthostatic VS  01-30-25 @ 22:51  Lying BP: --/-- HR: --  Sitting BP: 150/89 HR: 108  Standing BP: 137/88 HR: 110  Site: --  Mode: --  
Vital Signs Last 24 Hrs  T(C): 36.9 (02-02-25 @ 10:05), Max: 36.9 (02-02-25 @ 10:05)  T(F): 98.4 (02-02-25 @ 10:05), Max: 98.4 (02-02-25 @ 10:05)  HR: 99 (02-02-25 @ 10:05) (99 - 99)  BP: 146/91 (02-02-25 @ 10:05) (146/91 - 146/91)  BP(mean): --  RR: --  SpO2: --    Orthostatic VS  02-01-25 @ 08:50  Lying BP: --/-- HR: --  Sitting BP: 150/82 HR: 104  Standing BP: --/-- HR: --  Site: --  Mode: --  
Vital Signs Last 24 Hrs  T(C): 36.6 (02-05-25 @ 08:18), Max: 36.6 (02-05-25 @ 08:18)  T(F): 97.9 (02-05-25 @ 08:18), Max: 97.9 (02-05-25 @ 08:18)  HR: --  BP: --  BP(mean): --  RR: --  SpO2: --    Orthostatic VS  02-05-25 @ 08:18  Lying BP: --/-- HR: --  Sitting BP: 138/88 HR: 84  Standing BP: 125/93 HR: 109  Site: --  Mode: --  Orthostatic VS  02-04-25 @ 07:31  Lying BP: --/-- HR: --  Sitting BP: 145/91 HR: 90  Standing BP: 135/91 HR: 109  Site: --  Mode: --

## 2025-02-06 VITALS — TEMPERATURE: 97 F

## 2025-02-06 RX ADMIN — Medication 20 MILLIGRAM(S): at 08:33

## 2025-02-06 RX ADMIN — Medication 0.5 MILLIGRAM(S): at 08:33

## 2025-02-07 ENCOUNTER — OUTPATIENT (OUTPATIENT)
Dept: OUTPATIENT SERVICES | Facility: HOSPITAL | Age: 25
LOS: 1 days | Discharge: HOME | End: 2025-02-07
Payer: COMMERCIAL

## 2025-02-07 PROCEDURE — 90792 PSYCH DIAG EVAL W/MED SRVCS: CPT

## 2025-02-11 PROCEDURE — 99214 OFFICE O/P EST MOD 30 MIN: CPT

## 2025-02-12 DIAGNOSIS — F06.1 CATATONIC DISORDER DUE TO KNOWN PHYSIOLOGICAL CONDITION: ICD-10-CM

## 2025-02-12 DIAGNOSIS — F60.3 BORDERLINE PERSONALITY DISORDER: ICD-10-CM

## 2025-02-12 DIAGNOSIS — F33.2 MAJOR DEPRESSIVE DISORDER, RECURRENT SEVERE WITHOUT PSYCHOTIC FEATURES: ICD-10-CM

## 2025-02-19 PROCEDURE — 99214 OFFICE O/P EST MOD 30 MIN: CPT

## 2025-02-20 DIAGNOSIS — F31.61 BIPOLAR DISORDER, CURRENT EPISODE MIXED, MILD: ICD-10-CM

## 2025-02-26 PROCEDURE — 99214 OFFICE O/P EST MOD 30 MIN: CPT

## 2025-03-04 PROCEDURE — 99214 OFFICE O/P EST MOD 30 MIN: CPT

## 2025-03-10 PROCEDURE — 99213 OFFICE O/P EST LOW 20 MIN: CPT

## 2025-03-17 PROCEDURE — 99213 OFFICE O/P EST LOW 20 MIN: CPT

## 2025-03-20 ENCOUNTER — OUTPATIENT (OUTPATIENT)
Dept: OUTPATIENT SERVICES | Facility: HOSPITAL | Age: 25
LOS: 1 days | Discharge: HOME | End: 2025-03-20

## 2025-03-25 PROCEDURE — 99213 OFFICE O/P EST LOW 20 MIN: CPT
